# Patient Record
Sex: MALE | Race: WHITE | NOT HISPANIC OR LATINO
[De-identification: names, ages, dates, MRNs, and addresses within clinical notes are randomized per-mention and may not be internally consistent; named-entity substitution may affect disease eponyms.]

---
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
## 2019-01-01 ENCOUNTER — INPATIENT (INPATIENT)
Facility: HOSPITAL | Age: 19
LOS: 2 days | Discharge: HOME | End: 2019-01-04
Attending: INTERNAL MEDICINE | Admitting: INTERNAL MEDICINE
Payer: COMMERCIAL

## 2019-01-01 VITALS
SYSTOLIC BLOOD PRESSURE: 129 MMHG | HEART RATE: 96 BPM | TEMPERATURE: 99 F | DIASTOLIC BLOOD PRESSURE: 81 MMHG | OXYGEN SATURATION: 100 % | RESPIRATION RATE: 20 BRPM

## 2019-01-01 DIAGNOSIS — Z98.890 OTHER SPECIFIED POSTPROCEDURAL STATES: Chronic | ICD-10-CM

## 2019-01-01 DIAGNOSIS — L03.90 CELLULITIS, UNSPECIFIED: ICD-10-CM

## 2019-01-01 DIAGNOSIS — Z41.9 ENCOUNTER FOR PROCEDURE FOR PURPOSES OTHER THAN REMEDYING HEALTH STATE, UNSPECIFIED: Chronic | ICD-10-CM

## 2019-01-01 LAB
ALBUMIN SERPL ELPH-MCNC: 4.9 G/DL — SIGNIFICANT CHANGE UP (ref 3.3–5)
ALP SERPL-CCNC: 108 U/L — SIGNIFICANT CHANGE UP (ref 60–270)
ALT FLD-CCNC: 14 U/L — SIGNIFICANT CHANGE UP (ref 4–41)
AST SERPL-CCNC: 21 U/L — SIGNIFICANT CHANGE UP (ref 4–40)
BASE EXCESS BLDV CALC-SCNC: 2.9 MMOL/L — SIGNIFICANT CHANGE UP
BASOPHILS # BLD AUTO: 0.03 K/UL — SIGNIFICANT CHANGE UP (ref 0–0.2)
BASOPHILS NFR BLD AUTO: 0.2 % — SIGNIFICANT CHANGE UP (ref 0–2)
BILIRUB SERPL-MCNC: 0.3 MG/DL — SIGNIFICANT CHANGE UP (ref 0.2–1.2)
BLOOD GAS VENOUS - CREATININE: 0.61 MG/DL — SIGNIFICANT CHANGE UP (ref 0.5–1.3)
BUN SERPL-MCNC: 14 MG/DL — SIGNIFICANT CHANGE UP (ref 7–23)
CALCIUM SERPL-MCNC: 10.1 MG/DL — SIGNIFICANT CHANGE UP (ref 8.4–10.5)
CHLORIDE BLDV-SCNC: 105 MMOL/L — SIGNIFICANT CHANGE UP (ref 96–108)
CHLORIDE SERPL-SCNC: 101 MMOL/L — SIGNIFICANT CHANGE UP (ref 98–107)
CO2 SERPL-SCNC: 25 MMOL/L — SIGNIFICANT CHANGE UP (ref 22–31)
CREAT SERPL-MCNC: 0.78 MG/DL — SIGNIFICANT CHANGE UP (ref 0.5–1.3)
CRP SERPL-MCNC: 12.2 MG/L — HIGH
EOSINOPHIL # BLD AUTO: 0.26 K/UL — SIGNIFICANT CHANGE UP (ref 0–0.5)
EOSINOPHIL NFR BLD AUTO: 1.9 % — SIGNIFICANT CHANGE UP (ref 0–6)
ERYTHROCYTE [SEDIMENTATION RATE] IN BLOOD: 7 MM/HR — SIGNIFICANT CHANGE UP (ref 1–15)
GAS PNL BLDV: 135 MMOL/L — LOW (ref 136–146)
GLUCOSE BLDV-MCNC: 103 — HIGH (ref 70–99)
GLUCOSE SERPL-MCNC: 93 MG/DL — SIGNIFICANT CHANGE UP (ref 70–99)
HCO3 BLDV-SCNC: 25 MMOL/L — SIGNIFICANT CHANGE UP (ref 20–27)
HCT VFR BLD CALC: 41.1 % — SIGNIFICANT CHANGE UP (ref 39–50)
HCT VFR BLDV CALC: 43.4 % — SIGNIFICANT CHANGE UP (ref 39–51)
HGB BLD-MCNC: 14.2 G/DL — SIGNIFICANT CHANGE UP (ref 13–17)
HGB BLDV-MCNC: 14.1 G/DL — SIGNIFICANT CHANGE UP (ref 13–17)
IMM GRANULOCYTES # BLD AUTO: 0.04 # — SIGNIFICANT CHANGE UP
IMM GRANULOCYTES NFR BLD AUTO: 0.3 % — SIGNIFICANT CHANGE UP (ref 0–1.5)
LACTATE BLDV-MCNC: 1.3 MMOL/L — SIGNIFICANT CHANGE UP (ref 0.5–2)
LYMPHOCYTES # BLD AUTO: 16.9 % — SIGNIFICANT CHANGE UP (ref 13–44)
LYMPHOCYTES # BLD AUTO: 2.32 K/UL — SIGNIFICANT CHANGE UP (ref 1–3.3)
MAGNESIUM SERPL-MCNC: 2.2 MG/DL — SIGNIFICANT CHANGE UP (ref 1.6–2.6)
MCHC RBC-ENTMCNC: 30 PG — SIGNIFICANT CHANGE UP (ref 27–34)
MCHC RBC-ENTMCNC: 34.5 % — SIGNIFICANT CHANGE UP (ref 32–36)
MCV RBC AUTO: 86.9 FL — SIGNIFICANT CHANGE UP (ref 80–100)
MONOCYTES # BLD AUTO: 1.01 K/UL — HIGH (ref 0–0.9)
MONOCYTES NFR BLD AUTO: 7.4 % — SIGNIFICANT CHANGE UP (ref 2–14)
NEUTROPHILS # BLD AUTO: 10.04 K/UL — HIGH (ref 1.8–7.4)
NEUTROPHILS NFR BLD AUTO: 73.3 % — SIGNIFICANT CHANGE UP (ref 43–77)
NRBC # FLD: 0 — SIGNIFICANT CHANGE UP
PCO2 BLDV: 51 MMHG — SIGNIFICANT CHANGE UP (ref 41–51)
PH BLDV: 7.35 PH — SIGNIFICANT CHANGE UP (ref 7.32–7.43)
PHOSPHATE SERPL-MCNC: 3.7 MG/DL — SIGNIFICANT CHANGE UP (ref 2.5–4.5)
PLATELET # BLD AUTO: 290 K/UL — SIGNIFICANT CHANGE UP (ref 150–400)
PMV BLD: 9.1 FL — SIGNIFICANT CHANGE UP (ref 7–13)
PO2 BLDV: 21 MMHG — LOW (ref 35–40)
POTASSIUM BLDV-SCNC: 4.5 MMOL/L — SIGNIFICANT CHANGE UP (ref 3.4–4.5)
POTASSIUM SERPL-MCNC: 4.2 MMOL/L — SIGNIFICANT CHANGE UP (ref 3.5–5.3)
POTASSIUM SERPL-SCNC: 4.2 MMOL/L — SIGNIFICANT CHANGE UP (ref 3.5–5.3)
PROT SERPL-MCNC: 8.1 G/DL — SIGNIFICANT CHANGE UP (ref 6–8.3)
RBC # BLD: 4.73 M/UL — SIGNIFICANT CHANGE UP (ref 4.2–5.8)
RBC # FLD: 12.5 % — SIGNIFICANT CHANGE UP (ref 10.3–14.5)
SAO2 % BLDV: 28.3 % — LOW (ref 60–85)
SODIUM SERPL-SCNC: 140 MMOL/L — SIGNIFICANT CHANGE UP (ref 135–145)
WBC # BLD: 13.7 K/UL — HIGH (ref 3.8–10.5)
WBC # FLD AUTO: 13.7 K/UL — HIGH (ref 3.8–10.5)

## 2019-01-01 PROCEDURE — 99223 1ST HOSP IP/OBS HIGH 75: CPT

## 2019-01-01 RX ORDER — VANCOMYCIN HCL 1 G
1000 VIAL (EA) INTRAVENOUS ONCE
Qty: 0 | Refills: 0 | Status: COMPLETED | OUTPATIENT
Start: 2019-01-01 | End: 2019-01-01

## 2019-01-01 RX ORDER — PIPERACILLIN AND TAZOBACTAM 4; .5 G/20ML; G/20ML
3.38 INJECTION, POWDER, LYOPHILIZED, FOR SOLUTION INTRAVENOUS ONCE
Qty: 0 | Refills: 0 | Status: COMPLETED | OUTPATIENT
Start: 2019-01-01 | End: 2019-01-01

## 2019-01-01 RX ORDER — SODIUM CHLORIDE 9 MG/ML
1000 INJECTION, SOLUTION INTRAVENOUS ONCE
Qty: 0 | Refills: 0 | Status: COMPLETED | OUTPATIENT
Start: 2019-01-01 | End: 2019-01-01

## 2019-01-01 RX ORDER — DIPHENHYDRAMINE HCL 50 MG
25 CAPSULE ORAL ONCE
Qty: 0 | Refills: 0 | Status: COMPLETED | OUTPATIENT
Start: 2019-01-01 | End: 2019-01-02

## 2019-01-01 RX ADMIN — SODIUM CHLORIDE 2000 MILLILITER(S): 9 INJECTION, SOLUTION INTRAVENOUS at 22:58

## 2019-01-01 RX ADMIN — PIPERACILLIN AND TAZOBACTAM 200 GRAM(S): 4; .5 INJECTION, POWDER, LYOPHILIZED, FOR SOLUTION INTRAVENOUS at 22:14

## 2019-01-01 RX ADMIN — Medication 250 MILLIGRAM(S): at 22:47

## 2019-01-01 RX ADMIN — PIPERACILLIN AND TAZOBACTAM 3.38 GRAM(S): 4; .5 INJECTION, POWDER, LYOPHILIZED, FOR SOLUTION INTRAVENOUS at 22:45

## 2019-01-01 RX ADMIN — SODIUM CHLORIDE 1000 MILLILITER(S): 9 INJECTION, SOLUTION INTRAVENOUS at 23:57

## 2019-01-01 RX ADMIN — Medication 1000 MILLIGRAM(S): at 23:54

## 2019-01-01 NOTE — ED CLERICAL - NS ED CLERK NOTE PRE-ARRIVAL INFORMATION; ADDITIONAL PRE-ARRIVAL INFORMATION
S/P osteoma ablation 12/20. Now with possible cellulitis - on po antibiotics.  Also c/o weakness and lethargy, leg redness below knee X 2 days. No PMH

## 2019-01-01 NOTE — ED PROVIDER NOTE - OBJECTIVE STATEMENT
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 He first noticed swelling around the R shin area 3 days after the ablation but then it went down. However, starting 2 days ago he started noticing increased swelling and redness along with a low grade fever. He initially talked to his IR doctor Dr. Jose Louis and he gave him Amoxicillin 500mg TID yesterday and has taken a total of 5 doses. However, he called again Dr. Louis and he told him he should go in to the ED for concerns of cellulitis or OM. He denies any n/v/d/CP/SOB/drainage or abscesses. He is still able to ambulate
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
## 2019-01-01 NOTE — H&P ADULT - NSHPREVIEWOFSYSTEMS_GEN_ALL_CORE
Review of Systems:   CONSTITUTIONAL: subjective fever, no weight loss  EYES: No eye pain, visual disturbances, or discharge  ENMT:  No difficulty hearing, tinnitus, vertigo; No sinus or throat pain  NECK: No pain or stiffness  RESPIRATORY: No cough, wheezing, chills or hemoptysis; No shortness of breath  CARDIOVASCULAR: No chest pain, palpitations, dizziness, or leg swelling  GASTROINTESTINAL: No abdominal or epigastric pain. No nausea, vomiting, or hematemesis; No diarrhea or constipation. No melena or hematochezia.  GENITOURINARY: No dysuria, frequency, hematuria, or incontinence  NEUROLOGICAL: No headaches, memory loss, loss of strength, numbness, or tremors  SKIN: + scalp itching, right shin redness, pain to touch  LYMPH NODES: No enlarged glands  ENDOCRINE: No heat or cold intolerance; No hair loss  MUSCULOSKELETAL: No joint pain or swelling; No muscle, back, or extremity pain  PSYCHIATRIC: anxious  HEME/LYMPH: No easy bruising, or bleeding gums  ALLERY AND IMMUNOLOGIC: No hives or eczema

## 2019-01-01 NOTE — ED PROVIDER NOTE - CARE PLAN
Principal Discharge DX:	Cellulitis  Goal:	IV abx and needs MRI to further eval  Assessment and plan of treatment:	Concerns of cellulitis vs OM s/p R shin ablation, needs IV abx and MRI to further eval

## 2019-01-01 NOTE — ED PROVIDER NOTE - ATTENDING CONTRIBUTION TO CARE
18M p/w R shin pain and swelling -  S/p r shin bx 12/20/18; possible osteoid osteoma, done by IR here Dr Donovan louis; now having redness and swelling x 1-2 days, started on amox yesterday via phone.  Initially there was swelling but it went down; but got worse yesterday so called Dr.  Called Dr Louis today advised to come to ED.  Has gotten 5 doses of amox so far.  PMHX – none PSHX – none.  All – NKA.  Vs wnl.  Wound is erythematous, 10 x 5cm at site of incision, no exudate, reddened, swollen, tender.  Can walk on it.  Concern for osteo; plan check labs, xray, rx abx, fluids, to be d/w rads, admit.  VS:  unremarkable    GEN - NAD; well appearing; A+O x3   HEAD - NC/AT     ENT - PEERL, EOMI, mucous membranes dry, no discharge      NECK: Neck supple, non-tender without lymphadenopathy, no masses, no JVD  PULM - CTA b/l,  symmetric breath sounds  COR -  normal heart sounds    ABD - , ND, NT, soft,  BACK - no CVA tenderness, nontender spine     EXTREMS - no edema, no deformity, warm and well perfused  .  R ant shin wound no purulence,  large area of redness, warmth, tenderness surrounding (demarcated and dated).  diatal N/V/I.   SKIN - no rash or bruising      NEUROLOGIC - alert, CN 2-12 intact, sensation nl, motor no focal deficit.
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
## 2019-01-01 NOTE — ED PROVIDER NOTE - PSH
Elective surgery  teeth removed 2012  History of prior ablation treatment  Ablation of R shin osteoma

## 2019-01-01 NOTE — ED ADULT TRIAGE NOTE - CHIEF COMPLAINT QUOTE
pt. s/p ablation of an osteoma of the R shin on 12/20/18, reports increasing swelling around the site x 2 days and low grade fever at home (99.5F) today, placed on Amoxicillin 500mg yesterday, arrives w/ mild redness/swelling at the site of the ablation, no drainage/bleeding noted.

## 2019-01-01 NOTE — H&P ADULT - NSHPLABSRESULTS_GEN_ALL_CORE
Vital Signs Last 24 Hrs  T(C): 37.1 (02 Jan 2019 00:08), Max: 37.1 (01 Jan 2019 20:21)  T(F): 98.8 (02 Jan 2019 00:08), Max: 98.8 (02 Jan 2019 00:08)  HR: 86 (02 Jan 2019 00:08) (86 - 96)  BP: 127/66 (02 Jan 2019 00:08) (127/66 - 129/81)  BP(mean): --  RR: 20 (02 Jan 2019 00:08) (20 - 20)  SpO2: 100% (02 Jan 2019 00:08) (100% - 100%)                            14.2   13.70 )-----------( 290      ( 01 Jan 2019 22:10 )             41.1     01-01    140  |  101  |  14  ----------------------------<  93  4.2   |  25  |  0.78    Ca    10.1      01 Jan 2019 22:10  Phos  3.7     01-01  Mg     2.2     01-01    TPro  8.1  /  Alb  4.9  /  TBili  0.3  /  DBili  x   /  AST  21  /  ALT  14  /  AlkPhos  108  01-01    CAPILLARY BLOOD GLUCOSE

## 2019-01-01 NOTE — ED ADULT NURSE NOTE - NSIMPLEMENTINTERV_GEN_ALL_ED
Implemented All Universal Safety Interventions:  [place] to call system. Call bell, personal items and telephone within reach. Instruct patient to call for assistance. Room bathroom lighting operational. Non-slip footwear when patient is off stretcher. Physically safe environment: no spills, clutter or unnecessary equipment. Stretcher in lowest position, wheels locked, appropriate side rails in place.

## 2019-01-01 NOTE — H&P ADULT - NSHPPHYSICALEXAM_GEN_ALL_CORE
PHYSICAL EXAM:      Constitutional: mildy anxious  HEENT: EOMI, Normal Hearing  Neck: No LAD, No JVD  Back: Normal spine flexure, No CVA tenderness  Respiratory: CTAB  Cardiovascular: S1 and S2, RRR, no M/G/R  Gastrointestinal: BS+, soft, NT/ND  Extremities: No peripheral edema  Vascular: 2+ peripheral pulses  Neurological: A/O x 3, no focal deficits  Psychiatric: Normal mood, normal affect  Musculoskeletal: 5/5 strength b/l upper and lower extremities  Skin: central area of necrosis/mild serosanguinous drainage with surrounding erythema at right shin. PHYSICAL EXAM:      Constitutional: mildy anxious  HEENT: EOMI, Normal Hearing  Neck: No LAD, No JVD  Back: Normal spine flexure, No CVA tenderness  Respiratory: CTAB  Cardiovascular: loud S1 and S2, RRR, no M/G/R  Gastrointestinal: BS+, soft, NT/ND  Extremities: No peripheral edema  Vascular: 2+ peripheral pulses  Neurological: A/O x 3, no focal deficits  Psychiatric: Normal mood, normal affect  Musculoskeletal: 5/5 strength b/l upper and lower extremities  Skin: central area of necrosis/mild serosanguinous drainage with surrounding erythema at right shin.

## 2019-01-01 NOTE — H&P ADULT - PROBLEM SELECTOR PLAN 1
-concern for non-hematogenous OM based on recent bone ablation   -received zosyn and 3/4 of vancomycin at 1015 pm, 1050 pm respectively   -spoke with ID overnight, who recommend obtaining wound culture, and to hold further abx for now pending their eval in am unless pt decompensates.  -plain film ordered for now. Will likely need MR if suspicion remains for OM  -wound care ordered

## 2019-01-01 NOTE — ED PROVIDER NOTE - MEDICAL DECISION MAKING DETAILS
Admit to medicine and tx empirically for cellulitis vs OM s/p ablation, pending further imaging w/ MR

## 2019-01-01 NOTE — ED ADULT NURSE NOTE - OBJECTIVE STATEMENT
received pt to spot 27 aox3 in no apparent distress c/o R leg swelling/redness x1 days. Pt had recent procedure on 12/20/18 to remove osteoma had some mild swelling but states awoke yesterday with increased swelling and redness, was put on amoxicillin by PCP. Pt states began feeling flu like symptoms today with generalized malaise and was instructed to come to ED by PCP. Pt denies any leg pain, drainage fever, chills, n/v, SOB, numbness, tingling or difficulty walking. R leg appears slightly swollen and red from ankle up to mid calf. Leg is warm to touch. Procedure site is clean dry intact no drainage at this time, pedal pulses intact, no motor or neuro deficits at this time VSS pt afebrile will continue to monitor

## 2019-01-01 NOTE — ED PROVIDER NOTE - PLAN OF CARE
IV abx and needs MRI to further eval Concerns of cellulitis vs OM s/p R shin ablation, needs IV abx and MRI to further eval

## 2019-01-02 DIAGNOSIS — L03.115 CELLULITIS OF RIGHT LOWER LIMB: ICD-10-CM

## 2019-01-02 DIAGNOSIS — F41.9 ANXIETY DISORDER, UNSPECIFIED: ICD-10-CM

## 2019-01-02 DIAGNOSIS — T78.40XA ALLERGY, UNSPECIFIED, INITIAL ENCOUNTER: ICD-10-CM

## 2019-01-02 PROCEDURE — 99233 SBSQ HOSP IP/OBS HIGH 50: CPT | Mod: GC

## 2019-01-02 PROCEDURE — 99254 IP/OBS CNSLTJ NEW/EST MOD 60: CPT | Mod: GC

## 2019-01-02 PROCEDURE — 73590 X-RAY EXAM OF LOWER LEG: CPT | Mod: 26,RT

## 2019-01-02 RX ORDER — SODIUM CHLORIDE 9 MG/ML
1000 INJECTION INTRAMUSCULAR; INTRAVENOUS; SUBCUTANEOUS
Qty: 0 | Refills: 0 | Status: DISCONTINUED | OUTPATIENT
Start: 2019-01-02 | End: 2019-01-02

## 2019-01-02 RX ORDER — PIPERACILLIN AND TAZOBACTAM 4; .5 G/20ML; G/20ML
3.38 INJECTION, POWDER, LYOPHILIZED, FOR SOLUTION INTRAVENOUS EVERY 8 HOURS
Qty: 0 | Refills: 0 | Status: DISCONTINUED | OUTPATIENT
Start: 2019-01-02 | End: 2019-01-02

## 2019-01-02 RX ORDER — ENOXAPARIN SODIUM 100 MG/ML
40 INJECTION SUBCUTANEOUS DAILY
Qty: 0 | Refills: 0 | Status: DISCONTINUED | OUTPATIENT
Start: 2019-01-02 | End: 2019-01-03

## 2019-01-02 RX ORDER — CEFTRIAXONE 500 MG/1
2 INJECTION, POWDER, FOR SOLUTION INTRAMUSCULAR; INTRAVENOUS EVERY 24 HOURS
Qty: 0 | Refills: 0 | Status: DISCONTINUED | OUTPATIENT
Start: 2019-01-03 | End: 2019-01-04

## 2019-01-02 RX ORDER — VANCOMYCIN HCL 1 G
1000 VIAL (EA) INTRAVENOUS EVERY 12 HOURS
Qty: 0 | Refills: 0 | Status: DISCONTINUED | OUTPATIENT
Start: 2019-01-02 | End: 2019-01-02

## 2019-01-02 RX ORDER — CEFTRIAXONE 500 MG/1
INJECTION, POWDER, FOR SOLUTION INTRAMUSCULAR; INTRAVENOUS
Qty: 0 | Refills: 0 | Status: DISCONTINUED | OUTPATIENT
Start: 2019-01-02 | End: 2019-01-04

## 2019-01-02 RX ORDER — DIPHENHYDRAMINE HCL 50 MG
50 CAPSULE ORAL EVERY 12 HOURS
Qty: 0 | Refills: 0 | Status: DISCONTINUED | OUTPATIENT
Start: 2019-01-02 | End: 2019-01-04

## 2019-01-02 RX ORDER — CEFTRIAXONE 500 MG/1
2 INJECTION, POWDER, FOR SOLUTION INTRAMUSCULAR; INTRAVENOUS ONCE
Qty: 0 | Refills: 0 | Status: COMPLETED | OUTPATIENT
Start: 2019-01-02 | End: 2019-01-02

## 2019-01-02 RX ORDER — DIPHENHYDRAMINE HCL 50 MG
25 CAPSULE ORAL EVERY 6 HOURS
Qty: 0 | Refills: 0 | Status: DISCONTINUED | OUTPATIENT
Start: 2019-01-02 | End: 2019-01-04

## 2019-01-02 RX ORDER — VANCOMYCIN HCL 1 G
1000 VIAL (EA) INTRAVENOUS EVERY 12 HOURS
Qty: 0 | Refills: 0 | Status: DISCONTINUED | OUTPATIENT
Start: 2019-01-02 | End: 2019-01-03

## 2019-01-02 RX ORDER — ONDANSETRON 8 MG/1
4 TABLET, FILM COATED ORAL EVERY 8 HOURS
Qty: 0 | Refills: 0 | Status: DISCONTINUED | OUTPATIENT
Start: 2019-01-02 | End: 2019-01-04

## 2019-01-02 RX ADMIN — Medication 25 MILLIGRAM(S): at 00:01

## 2019-01-02 RX ADMIN — Medication 25 MILLIGRAM(S): at 17:40

## 2019-01-02 RX ADMIN — Medication 50 MILLIGRAM(S): at 09:27

## 2019-01-02 RX ADMIN — Medication 125 MILLIGRAM(S): at 18:27

## 2019-01-02 RX ADMIN — CEFTRIAXONE 100 GRAM(S): 500 INJECTION, POWDER, FOR SOLUTION INTRAMUSCULAR; INTRAVENOUS at 12:35

## 2019-01-02 RX ADMIN — ONDANSETRON 4 MILLIGRAM(S): 8 TABLET, FILM COATED ORAL at 09:27

## 2019-01-02 NOTE — CONSULT NOTE ADULT - SUBJECTIVE AND OBJECTIVE BOX
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 He was started on amoxicillin day prior to presentation with no improvement.   HPI (H&P):  19 yo m h/o ADHD no longer on ritalin, anxiety not on standing meds. Pt underwent ablation procedure/core biopsy  Dec 20th on right shin for suspected  osteoid osteoma. Biopsy has since come back NON-defintive for osteoid osteoma. 2 days ago developed  swelling/erythema around ablation site. Pt placed on amoxicillin yesterday by Dr Louis, pt reporting no improvement. Pt reports subjective fevers at home, here afebrile. Denies nausea, vomiting. WBC 13k, CRP 12.2. On exam, central area of necrosis/mild serosanguinous drainage with surrounding erythema at right shin. Received zosyn in ed. 3/4 through vanco dose, pt reported diffuse scalp itching, facial warmth. Pt pale at baseline, perhaps slightly less pale since complaint of  facial warmth. (01 Jan 2019 23:24)      PAST MEDICAL & SURGICAL HISTORY:  Osteoma  Unspecified fracture of shaft of left radius, subsequent encounter for closed fracture with routine healing  ADD (attention deficit disorder)  RSV bronchiolitis: as a child  History of prior ablation treatment: Ablation of R shin osteoma  Elective surgery: teeth removed 2012      Allergies  Concerta (Hives; Short breath)  Concerta (Unknown)  latex (Short breath; Rash; Urticaria)  latex (Unknown)  Ritalin (Hives)        ANTIMICROBIALS:  piperacillin/tazobactam IVPB. 3.375 every 8 hours  vancomycin  IVPB 1000 every 12 hours    MEDICATIONS  (STANDING):  piperacillin/tazobactam IVPB.   200 mL/Hr IV Intermittent (01-01-19 @ 22:14)    vancomycin  IVPB   250 mL/Hr IV Intermittent (01-01-19 @ 22:47)    OTHER MEDS: MEDICATIONS  (STANDING):  diphenhydrAMINE 50 every 12 hours PRN  ondansetron    Tablet 4 every 8 hours PRN    SOCIAL HISTORY:       FAMILY HISTORY:  No pertinent family history in first degree relatives    REVIEW OF SYSTEMS  [  ] ROS unobtainable because:    [  ] All other systems negative except as noted below:	    Constitutional:  [ ] fever [ ] chills  [ ] weight loss  [ ] weakness  Skin:  [ ] rash [ ] phlebitis	  Eyes: [ ] icterus [ ] pain  [ ] discharge	  ENMT: [ ] sore throat  [ ] thrush [ ] ulcers [ ] exudates  Respiratory: [ ] dyspnea [ ] hemoptysis [ ] cough [ ] sputum	  Cardiovascular:  [ ] chest pain [ ] palpitations [ ] edema	  Gastrointestinal:  [ ] nausea [ ] vomiting [ ] diarrhea [ ] constipation [ ] pain	  Genitourinary:  [ ] dysuria [ ] frequency [ ] hematuria [ ] discharge [ ] flank pain  [ ] incontinence  Musculoskeletal:  [ ] myalgias [ ] arthralgias [ ] arthritis  [ ] back pain  Neurological:  [ ] headache [ ] seizures  [ ] confusion/altered mental status  Psychiatric:  [ ] anxiety [ ] depression	  Hematology/Lymphatics:  [ ] lymphadenopathy  Endocrine:  [ ] adrenal [ ] thyroid  Allergic/Immunologic:	 [ ] transplant [ ] seasonal    Vital Signs Last 24 Hrs  T(F): 97.7 (01-02-19 @ 06:31), Max: 98.8 (01-02-19 @ 00:08)    Vital Signs Last 24 Hrs  HR: 81 (01-02-19 @ 06:31) (81 - 96)  BP: 102/56 (01-02-19 @ 06:31) (102/56 - 129/81)  RR: 16 (01-02-19 @ 06:31)  SpO2: 98% (01-02-19 @ 06:31) (98% - 100%)  Wt(kg): --    PHYSICAL EXAM:  PENDING, pre-populated below  General: non-toxic  HEAD/EYES: anicteric, PERRL  ENT:  supple  Cardiovascular:   S1, S2  Respiratory:  clear bilaterally  GI:  soft, non-tender, normal bowel sounds  :  no CVA tenderness   Musculoskeletal:  no synovitis  Neurologic:  grossly non-focal  Skin:  no rash  Lymph: no lymphadenopathy  Psychiatric:  appropriate affect  Vascular:  no phlebitis          WBC Count: 13.70 K/uL (01-01 @ 22:10)                            14.2   13.70 )-----------( 290      ( 01 Jan 2019 22:10 )             41.1       01-01    140  |  101  |  14  ----------------------------<  93  4.2   |  25  |  0.78    Ca    10.1      01 Jan 2019 22:10  Phos  3.7     01-01  Mg     2.2     01-01    TPro  8.1  /  Alb  4.9  /  TBili  0.3  /  DBili  x   /  AST  21  /  ALT  14  /  AlkPhos  108  01-01  Creatinine Trend: 0.78<--    MICROBIOLOGY:  pending blood culture x 2    RADIOLOGY:  no new. XR RLE and MRI RLE are ordered and pending. 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 He was started on amoxicillin day prior to presentation with no improvement. In the ED he was afebrile, normotensive, mildly tachycardic. He received Zosyn without complication but developed itching over head and upper chest about 30 minutes after his vancomycin dose started. He has still been able to walk on the leg since his surgery. It was initially painful and slightly swollen for about 3 days post-operatively, then improved. However, over the past 2-3 days it became increasingly swollen around the biopsy site (without sarah purulent discharge) and developed surrounding tenderness over the skin with some redness. The patient feels that the pain and redness are improved today after IV abx yesterday. Imaging has not yet been completed. He endorses subjective fevers at home.    He is sexually active with one female partner, using contraception regularly. No recent weight loss, constipation, diarrhea, recurring infections, joint swelling or redness, cough, chills, runny nose, sore throat, back pain, stiff neck, swollen lymph nodes, new skin lesions. He does endorse chronic acne and R ankle occasional pain without swelling/warmth/erythema, intermittent, preceding appearance of his R tibial lesion.    His surgical history is significant for L forearm metal plate years ago without recent redness.     PAST MEDICAL & SURGICAL HISTORY:  Osteoma  Unspecified fracture of shaft of left radius, subsequent encounter for closed fracture with routine healing  ADD (attention deficit disorder)  RSV bronchiolitis: as a child  History of prior ablation treatment: Ablation of R shin osteoma  Elective surgery: teeth removed 2012      Allergies  Concerta (Hives; Short breath)  Concerta (Unknown)  latex (Short breath; Rash; Urticaria)  latex (Unknown)  Ritalin (Hives)    ANTIMICROBIALS:  piperacillin/tazobactam IVPB. 3.375 every 8 hours  vancomycin  IVPB 1000 every 12 hours    MEDICATIONS  (STANDING):  piperacillin/tazobactam IVPB.   200 mL/Hr IV Intermittent (01-01-19 @ 22:14)    vancomycin  IVPB   250 mL/Hr IV Intermittent (01-01-19 @ 22:47)    OTHER MEDS: MEDICATIONS  (STANDING):  diphenhydrAMINE 50 every 12 hours PRN  ondansetron    Tablet 4 every 8 hours PRN    SOCIAL HISTORY:     Sexually active with 1 female partner  Vaping: endorses  Tobacco use: denies  Marijuana use: endorses, last 11/30  Other recreational drug use: denies  In college, studying [email or web address], 2 yr program    FAMILY HISTORY:  No pertinent family history in first degree relatives    REVIEW OF SYSTEMS  [  ] ROS unobtainable because:    [x  ] All other systems negative except as noted below:	    Constitutional:  [x ] fever [ ] chills  [ ] weight loss  [ ] weakness  Skin:  [x ] rash [ ] phlebitis	  Eyes: [ ] icterus [ ] pain  [ ] discharge	  ENMT: [ ] sore throat  [ ] thrush [ ] ulcers [ ] exudates  Respiratory: [ ] dyspnea [ ] hemoptysis [ ] cough [ ] sputum	  Cardiovascular:  [ ] chest pain [ ] palpitations [ ] edema	  Gastrointestinal:  [ ] nausea [ ] vomiting [ ] diarrhea [ ] constipation [ ] pain	  Genitourinary:  [ ] dysuria [ ] frequency [ ] hematuria [ ] discharge [ ] flank pain  [ ] incontinence  Musculoskeletal:  [ ] myalgias [ ] arthralgias [ ] arthritis  [ ] back pain [x] intermittent R ankle pain without swelling  Neurological:  [ ] headache [ ] seizures  [ ] confusion/altered mental status  Psychiatric:  [ ] anxiety [ ] depression	  Hematology/Lymphatics:  [ ] lymphadenopathy  Endocrine:  [ ] adrenal [ ] thyroid  Allergic/Immunologic:	 [ ] transplant [ ] seasonal    Vital Signs Last 24 Hrs  T(F): 97.7 (01-02-19 @ 06:31), Max: 98.8 (01-02-19 @ 00:08)    Vital Signs Last 24 Hrs  HR: 81 (01-02-19 @ 06:31) (81 - 96)  BP: 102/56 (01-02-19 @ 06:31) (102/56 - 129/81)  RR: 16 (01-02-19 @ 06:31)  SpO2: 98% (01-02-19 @ 06:31) (98% - 100%)  Wt(kg): --    PHYSICAL EXAM:  PENDING, pre-populated below  General: non-toxic male sitting up in bed in NAD, breathing comfortably  HEAD/EYES: anicteric, PERRL  ENT:  supple  Cardiovascular:  S1, S2  Respiratory:  clear bilaterally  GI:  soft, non-tender, normal bowel sounds  :  no CVA tenderness   Musculoskeletal:  R ankle no ttp/swelling/warmth, no joint swelling warmth/ttp/erythema throughout; no synovitis  Neurologic:  grossly non-focal  Skin: RLE tibial biopsy site with erythematous granulation tissue, no purulence, surrounding induration w/o fluctuance, ttp to marked border of surrounding erythema  Lymph: no lymphadenopathy  Psychiatric:  appropriate affect  Vascular:  no phlebitis    LABS:   WBC Count: 13.70 K/uL (01-01 @ 22:10)                        14.2   13.70 )-----------( 290      ( 01 Jan 2019 22:10 )             41.1     01-01    140  |  101  |  14  ----------------------------<  93  4.2   |  25  |  0.78    Ca    10.1      01 Jan 2019 22:10  Phos  3.7     01-01  Mg     2.2     01-01    TPro  8.1  /  Alb  4.9  /  TBili  0.3  /  DBili  x   /  AST  21  /  ALT  14  /  AlkPhos  108  01-01  Creatinine Trend: 0.78<--    MICROBIOLOGY:  pending blood culture x 2    RADIOLOGY:  no new. XR RLE and MRI RLE are ordered and pending.

## 2019-01-02 NOTE — CONSULT NOTE ADULT - ATTENDING COMMENTS
Right LE cellulitis better  Agree with imaging to r/o deeper infection  Continue vancomycin 1 gm iv q12 for staph coverage- likely red man reaction in ER  Cont ceftriaxone 1 gm iv q24 for strep/gnr coverage    Benny Bauman  Attending Physician   Division of Infectious Disease  Pager #[phone]  After 5pm/weekend or no response, call #[phone]

## 2019-01-02 NOTE — ED ADULT NURSE REASSESSMENT NOTE - NS ED NURSE REASSESS COMMENT FT1
pt left floor in no apparent distress. aox3 denying any abdominal pain or N/VM report given to JERAD Lee

## 2019-01-02 NOTE — PROGRESS NOTE ADULT - SUBJECTIVE AND OBJECTIVE BOX
Interventional Radiology Follow- Up Note      18y Male s/p right tibial osteoid osteoma ablation on 12/20/18 in Interventional Radiology with Dr. Louis. Patient seen and examined @ bedside.   No complaints offered. Patient is resting comfortably.     Vitals: T(F): 97.7 (01-02-19 @ 06:31), Max: 98.8 (01-02-19 @ 00:08)  HR: 81 (01-02-19 @ 06:31) (81 - 96)  BP: 102/56 (01-02-19 @ 06:31) (102/56 - 129/81)  RR: 16 (01-02-19 @ 06:31) (16 - 20)  SpO2: 98% (01-02-19 @ 06:31) (98% - 100%)  Wt(kg): --    LABS:                        14.2   13.70 )-----------( 290      ( 01 Jan 2019 22:10 )             41.1     01-01    140  |  101  |  14  ----------------------------<  93  4.2   |  25  |  0.78    Ca    10.1      01 Jan 2019 22:10  Phos  3.7     01-01  Mg     2.2     01-01    TPro  8.1  /  Alb  4.9  /  TBili  0.3  /  DBili  x   /  AST  21  /  ALT  14  /  AlkPhos  108  01-01      I&O's Detail    01 Jan 2019 07:01  -  02 Jan 2019 06:33  --------------------------------------------------------  IN:  Total IN: 0 mL    OUT:    Voided: 300 mL  Total OUT: 300 mL    Total NET: -300 mL            PHYSICAL EXAM:    General: Nontoxic, in NAD  Neuro:  Alert & oriented x 3  Extremities: RLE-Erythema and swelling noted surrounding the distal tibia. Tibial puncture site is indurated and erythematous TTP along soft tissue of distal tibia and along the bone. No fluctuance noted underlying the puncture site.     Impression: 18y Male s/p Right Tibial osteoid osteoma ablation presenting with RLE cellulitis    Plan:  -continue to monitor  -c/w IVF  -F/Up ID recs regarding antibiotics and possibility of osteomyelitis    Del Alvarado MD  PGY-5, Interventional Radiology     Please call IR at extension [phone] with any questions, concerns, or issues regarding above.

## 2019-01-02 NOTE — PROGRESS NOTE ADULT - PROBLEM SELECTOR PLAN 1
-concern for non-hematogenous OM based on recent bone ablation   -received zosyn and 3/4 of vancomycin at 1015 pm, 1050 pm respectively   -spoke with ID overnight, who recommend obtaining wound culture, and to hold further abx for now pending their eval in am unless pt decompensates.  -plain film ordered for now.  MR also ordered.  -wound care ordered -concern for non-hematogenous OM based on recent bone ablation   -received zosyn and 3/4 of vancomycin at 1015 pm, 1050 pm respectively   -spoke with ID overnight, who recommend obtaining wound culture which was obtained.  For now will continue w/ ceftriaxone as there is low clinical suspicion for MRSA given absence of pustular drainage in the setting of vancomycin sensitivity by patient.   -plain film ordered for now.  MR also ordered.  -wound care ordered

## 2019-01-02 NOTE — CONSULT NOTE ADULT - ASSESSMENT
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     #RLE cellulitis, r/o non-hematogenous osteomyelitis: exam is concerning for RLE cellulitis surrounding biopsy site with ttp, warmth, redness. Symptoms improved overnight after abx received in ED. Patient had facial flushing and pruritus after first vancomycin dose; no reported symptoms of anaphylaxis such as urticaria, angioedema, diffuse pruritus, hypotension, n/v. Imaging is pending to r/o non-hematogenous osteomyelitis.   -would c/w vancomycin at half of the initial infusion rate, pre-treating with antihistamine, for adequate MRSA coverage.  -to minimize risk of nephrotoxicity, would change Zosyn to ceftriaxone. Low risk for pseudomonas infection, and a third-gen cephalosporin should provide adequate gram negative coverage.   -agree with MRI RLE.      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     #RLE cellulitis, r/o non-hematogenous osteomyelitis: exam is concerning for RLE cellulitis surrounding biopsy site with ttp, warmth, redness. Symptoms improved overnight after abx received in ED. Patient had facial flushing and pruritus after first vancomycin dose; no reported symptoms of anaphylaxis such as urticaria, angioedema, diffuse pruritus, hypotension, n/v. Imaging is pending to r/o non-hematogenous osteomyelitis.   -would c/w vancomycin at half of the initial infusion rate, pre-treating with antihistamine, for adequate MRSA coverage.  -to minimize risk of nephrotoxicity, would change Zosyn to ceftriaxone. Low risk for pseudomonas infection, and a third-gen cephalosporin should provide adequate gram negative coverage.   -agree with MRI RLE.      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     #RLE cellulitis, r/o non-hematogenous osteomyelitis: exam is concerning for RLE cellulitis surrounding biopsy site with ttp, warmth, redness. Symptoms improved overnight after abx received in ED. Patient had facial flushing and pruritus after first vancomycin dose; no reported symptoms of anaphylaxis such as urticaria, angioedema, diffuse pruritus, hypotension, n/v. Imaging is pending to r/o non-hematogenous osteomyelitis.   -would c/w vancomycin at half of the initial infusion rate, pre-treating with antihistamine, for adequate MRSA coverage.  -to minimize risk of nephrotoxicity, would change Zosyn to ceftriaxone. Low risk for pseudomonas infection, and a third-gen cephalosporin should provide adequate gram negative coverage.   -f/u blood cultures. Send wound culture if debrided.  -agree with MRI RLE.      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     #RLE cellulitis, r/o non-hematogenous osteomyelitis: exam is concerning for RLE cellulitis surrounding biopsy site with ttp, warmth, redness. Symptoms improved overnight after abx received in ED. Patient had facial flushing and pruritus after first vancomycin dose; no reported symptoms of anaphylaxis such as urticaria, angioedema, diffuse pruritus, hypotension, n/v. Imaging is pending to r/o non-hematogenous osteomyelitis.   -would c/w vancomycin at half of the initial infusion rate, pre-treating with antihistamine, for adequate MRSA coverage.  -to minimize risk of nephrotoxicity, would change Zosyn to ceftriaxone. Low risk for pseudomonas infection, and a third-gen cephalosporin should provide adequate gram negative coverage.   -f/u blood cultures. Send wound culture if debrided.  -agree with MRI RLE.      Heron DODSON consult resident, pgy3  Pager [phone], [phone]

## 2019-01-02 NOTE — PROGRESS NOTE ADULT - SUBJECTIVE AND OBJECTIVE BOX
Alisa Arzate MD  PGY1 | Dept of Internal Medicine  Spectra [id number]/[facility] [phone]        Patient is a 18y old  Male who presents with a chief complaint of leg infection (02 Jan 2019 09:24)      SUBJECTIVE / OVERNIGHT EVENTS:  Pt admitted overnight to rule out osteomyelitis.  No fevers/chills overnight our pustular drainage from affected/biopsy site.  ROS negative for CP, SOB, abdominal pain, V/D/C + N.         CONSTITUTIONAL:  No weight loss, fever, chills, weakness or fatigue.  HEENT:  Eyes:  No visual loss, blurred vision, double vision or yellow sclerae. Ears, Nose, Throat:  No hearing loss, sneezing, congestion, runny nose or sore throat.  SKIN:  No rash or itching.  CARDIOVASCULAR:  No chest pain, chest pressure or chest discomfort. No palpitations or edema.  RESPIRATORY:  No shortness of breath, cough or sputum.  GASTROINTESTINAL:  No anorexia, nausea, vomiting or diarrhea. No abdominal pain or blood.  GENITOURINARY:  Denies hematuria, dysuria.   NEUROLOGICAL:  No headache, dizziness, syncope, paralysis, ataxia, numbness or tingling in the extremities. No change in bowel or bladder control.  MUSCULOSKELETAL:  No muscle, back pain, joint pain or stiffness.  HEMATOLOGIC:  No anemia, bleeding or bruising.  LYMPHATICS:  No enlarged nodes. No history of splenectomy.  PSYCHIATRIC:  No history of depression or anxiety.  ENDOCRINOLOGIC:  No reports of sweating, cold or heat intolerance. No polyuria or polydipsia.  ALLERGIES:  No history of asthma, hives, eczema or rhinitis.        Vital Signs Last 24 Hrs  T(C): 36.5 (02 Jan 2019 06:31), Max: 37.1 (01 Jan 2019 20:21)  T(F): 97.7 (02 Jan 2019 06:31), Max: 98.8 (02 Jan 2019 00:08)  HR: 81 (02 Jan 2019 06:31) (81 - 96)  BP: 102/56 (02 Jan 2019 06:31) (102/56 - 129/81)  BP(mean): --  RR: 16 (02 Jan 2019 06:31) (16 - 20)  SpO2: 98% (02 Jan 2019 06:31) (98% - 100%)    I&O's Summary    01 Jan 2019 07:01  -  02 Jan 2019 07:00  --------------------------------------------------------  IN: 0 mL / OUT: 300 mL / NET: -300 mL        PHYSICAL EXAM:  GENERAL: NAD, well-developed  HEAD:  Atraumatic, Normocephalic  EYES: EOMI, PERRLA, conjunctiva and sclera clear  NECK: Supple, No JVD  CHEST/LUNG: Clear to auscultation bilaterally; No wheeze  HEART: Regular rate and rhythm; No murmurs, rubs, or gallops  ABDOMEN: Soft, Nontender, Nondistended; Bowel sounds present  EXTREMITIES:  2+ Peripheral Pulses, No clubbing, cyanosis, or edema.  R leg indurated area marked w/ central opening which was where his biopsy site was.  Elevated at biopsy site w no serosanguinous drainage or pustular drainage.    PSYCH: AAOx3  NEUROLOGY: non-focal  SKIN: No rashes or lesions      MEDICATIONS  (STANDING):  cefTRIAXone   IVPB        MEDICATIONS  (PRN):  diphenhydrAMINE 50 milliGRAM(s) Oral every 12 hours PRN Rash and/or Itching  ondansetron    Tablet 4 milliGRAM(s) Oral every 8 hours PRN Nausea and/or Vomiting      LABS:  CAPILLARY BLOOD GLUCOSE                              14.2   13.70 )-----------( 290      ( 01 Jan 2019 22:10 )             41.1     Auto Eosinophil # 0.26  / Auto Eosinophil % 1.9   / Auto Neutrophil # 10.04 / Auto Neutrophil % 73.3  / BANDS % x        Hgb Trend: 14.2<--  01-01    140  |  101  |  14  ----------------------------<  93  4.2   |  25  |  0.78    Ca    10.1      01 Jan 2019 22:10  Mg     2.2     01-01  Phos  3.7     01-01  TPro  8.1  /  Alb  4.9  /  TBili  0.3  /  DBili  x   /  AST  21  /  ALT  14  /  AlkPhos  108  01-01    Creatinine Trend: 0.78<--                ABG:   VBG: ( 22:10 ) - VBG - pH: 7.35  | pCO2: 51    | pO2: 21    | Lactate: 1.3

## 2019-01-02 NOTE — ED ADULT NURSE REASSESSMENT NOTE - NS ED NURSE REASSESS COMMENT FT1
pt c/o of itchiness and heat after completion of vanco, provider at bedside pt medicated as per order pt denies feeling SOB, chest pain/tightness, chest pain red from scratching no rash noted to extremities, report given to JERAD Patton will continue to monitor until leaves floor

## 2019-01-03 ENCOUNTER — TRANSCRIPTION ENCOUNTER (OUTPATIENT)
Age: 19
End: 2019-01-03

## 2019-01-03 DIAGNOSIS — Z29.9 ENCOUNTER FOR PROPHYLACTIC MEASURES, UNSPECIFIED: ICD-10-CM

## 2019-01-03 LAB
BASOPHILS # BLD AUTO: 0.03 K/UL — SIGNIFICANT CHANGE UP (ref 0–0.2)
BASOPHILS NFR BLD AUTO: 0.4 % — SIGNIFICANT CHANGE UP (ref 0–2)
BUN SERPL-MCNC: 14 MG/DL — SIGNIFICANT CHANGE UP (ref 7–23)
CALCIUM SERPL-MCNC: 10 MG/DL — SIGNIFICANT CHANGE UP (ref 8.4–10.5)
CHLORIDE SERPL-SCNC: 101 MMOL/L — SIGNIFICANT CHANGE UP (ref 98–107)
CO2 SERPL-SCNC: 24 MMOL/L — SIGNIFICANT CHANGE UP (ref 22–31)
CREAT SERPL-MCNC: 0.72 MG/DL — SIGNIFICANT CHANGE UP (ref 0.5–1.3)
EOSINOPHIL # BLD AUTO: 0.31 K/UL — SIGNIFICANT CHANGE UP (ref 0–0.5)
EOSINOPHIL NFR BLD AUTO: 4.3 % — SIGNIFICANT CHANGE UP (ref 0–6)
GLUCOSE SERPL-MCNC: 93 MG/DL — SIGNIFICANT CHANGE UP (ref 70–99)
HCT VFR BLD CALC: 41.3 % — SIGNIFICANT CHANGE UP (ref 39–50)
HGB BLD-MCNC: 13.9 G/DL — SIGNIFICANT CHANGE UP (ref 13–17)
IMM GRANULOCYTES NFR BLD AUTO: 0.3 % — SIGNIFICANT CHANGE UP (ref 0–1.5)
LYMPHOCYTES # BLD AUTO: 2.37 K/UL — SIGNIFICANT CHANGE UP (ref 1–3.3)
LYMPHOCYTES # BLD AUTO: 32.6 % — SIGNIFICANT CHANGE UP (ref 13–44)
MAGNESIUM SERPL-MCNC: 2.1 MG/DL — SIGNIFICANT CHANGE UP (ref 1.6–2.6)
MCHC RBC-ENTMCNC: 29.6 PG — SIGNIFICANT CHANGE UP (ref 27–34)
MCHC RBC-ENTMCNC: 33.7 % — SIGNIFICANT CHANGE UP (ref 32–36)
MCV RBC AUTO: 87.9 FL — SIGNIFICANT CHANGE UP (ref 80–100)
MONOCYTES # BLD AUTO: 0.83 K/UL — SIGNIFICANT CHANGE UP (ref 0–0.9)
MONOCYTES NFR BLD AUTO: 11.4 % — SIGNIFICANT CHANGE UP (ref 2–14)
NEUTROPHILS # BLD AUTO: 3.7 K/UL — SIGNIFICANT CHANGE UP (ref 1.8–7.4)
NEUTROPHILS NFR BLD AUTO: 51 % — SIGNIFICANT CHANGE UP (ref 43–77)
NRBC # FLD: 0 — SIGNIFICANT CHANGE UP
PHOSPHATE SERPL-MCNC: 4.5 MG/DL — SIGNIFICANT CHANGE UP (ref 2.5–4.5)
PLATELET # BLD AUTO: 282 K/UL — SIGNIFICANT CHANGE UP (ref 150–400)
PMV BLD: 9.9 FL — SIGNIFICANT CHANGE UP (ref 7–13)
POTASSIUM SERPL-MCNC: 4 MMOL/L — SIGNIFICANT CHANGE UP (ref 3.5–5.3)
POTASSIUM SERPL-SCNC: 4 MMOL/L — SIGNIFICANT CHANGE UP (ref 3.5–5.3)
RBC # BLD: 4.7 M/UL — SIGNIFICANT CHANGE UP (ref 4.2–5.8)
RBC # FLD: 12.8 % — SIGNIFICANT CHANGE UP (ref 10.3–14.5)
SODIUM SERPL-SCNC: 138 MMOL/L — SIGNIFICANT CHANGE UP (ref 135–145)
SPECIMEN SOURCE: SIGNIFICANT CHANGE UP
SPECIMEN SOURCE: SIGNIFICANT CHANGE UP
VANCOMYCIN TROUGH SERPL-MCNC: 5.4 UG/ML — LOW (ref 10–20)
WBC # BLD: 7.26 K/UL — SIGNIFICANT CHANGE UP (ref 3.8–10.5)
WBC # FLD AUTO: 7.26 K/UL — SIGNIFICANT CHANGE UP (ref 3.8–10.5)

## 2019-01-03 PROCEDURE — 73720 MRI LWR EXTREMITY W/O&W/DYE: CPT | Mod: 26,RT

## 2019-01-03 PROCEDURE — 99232 SBSQ HOSP IP/OBS MODERATE 35: CPT | Mod: GC

## 2019-01-03 PROCEDURE — 99233 SBSQ HOSP IP/OBS HIGH 50: CPT | Mod: GC

## 2019-01-03 RX ORDER — ACETAMINOPHEN 500 MG
650 TABLET ORAL ONCE
Qty: 0 | Refills: 0 | Status: COMPLETED | OUTPATIENT
Start: 2019-01-03 | End: 2019-01-03

## 2019-01-03 RX ORDER — IBUPROFEN 200 MG
600 TABLET ORAL ONCE
Qty: 0 | Refills: 0 | Status: COMPLETED | OUTPATIENT
Start: 2019-01-03 | End: 2019-01-03

## 2019-01-03 RX ORDER — VANCOMYCIN HCL 1 G
1000 VIAL (EA) INTRAVENOUS EVERY 8 HOURS
Qty: 0 | Refills: 0 | Status: DISCONTINUED | OUTPATIENT
Start: 2019-01-03 | End: 2019-01-04

## 2019-01-03 RX ADMIN — Medication 600 MILLIGRAM(S): at 19:30

## 2019-01-03 RX ADMIN — Medication 125 MILLIGRAM(S): at 05:56

## 2019-01-03 RX ADMIN — CEFTRIAXONE 100 GRAM(S): 500 INJECTION, POWDER, FOR SOLUTION INTRAMUSCULAR; INTRAVENOUS at 10:00

## 2019-01-03 RX ADMIN — Medication 650 MILLIGRAM(S): at 18:47

## 2019-01-03 RX ADMIN — Medication 600 MILLIGRAM(S): at 20:00

## 2019-01-03 RX ADMIN — Medication 50 MILLIGRAM(S): at 18:45

## 2019-01-03 RX ADMIN — Medication 250 MILLIGRAM(S): at 19:30

## 2019-01-03 RX ADMIN — Medication 50 MILLIGRAM(S): at 04:53

## 2019-01-03 RX ADMIN — Medication 650 MILLIGRAM(S): at 17:47

## 2019-01-03 NOTE — DISCHARGE NOTE ADULT - PLAN OF CARE
continue antibiotics You came to the ED with a leg infection.  We performed an X-ray and MRI of your leg to rule osteomyelitis, which is an infection of your bone.  The results showed no infection of the bone and you are being discharged with antibiotics for cellulitis.  Please continue your prescribed course and follow up with your PCP. You came to the ED with a leg infection.  We performed an X-ray and MRI of your leg to rule osteomyelitis, which is an infection of your bone.  The results showed no infection of the bone and you are being discharged with antibiotics for cellulitis.  Please continue your prescribed course which is Keflex 1 500mg pill 4x a day (every 6 hours) and follow up with your PCP.

## 2019-01-03 NOTE — DISCHARGE NOTE ADULT - MEDICATION SUMMARY - MEDICATIONS TO TAKE
I will START or STAY ON the medications listed below when I get home from the hospital:    Keflex 500 mg oral capsule  -- 1 cap(s) by mouth 4 times a day for cellulitis   -- Finish all this medication unless otherwise directed by prescriber.    -- Indication: For Cellulitis of right lower extremity

## 2019-01-03 NOTE — DISCHARGE NOTE ADULT - CARE PROVIDER_API CALL
Richard Bardales), Pediatrics  [address] [place] [address]  Phone: [phone]  Fax: [phone] Richard Bardales), Pediatrics  [address] [place] [address]  Phone: [phone]  Fax: [phone]    Donovan Louis), [facility]  [address] [place] [address]  Phone: [phone]  Fax: [phone]

## 2019-01-03 NOTE — PROGRESS NOTE ADULT - SUBJECTIVE AND OBJECTIVE BOX
Alisa Arzate MD  PGY1 | Dept of Internal Medicine  Spectra [id number]/[facility] [phone]        Patient is a 18y old  Male who presents with a chief complaint of leg infection (02 Jan 2019 13:51)      SUBJECTIVE / OVERNIGHT EVENTS: No events overnight.          CONSTITUTIONAL:  No weight loss, fever, chills, weakness or fatigue.  HEENT:  Eyes:  No visual loss, blurred vision, double vision or yellow sclerae. Ears, Nose, Throat:  No hearing loss, sneezing, congestion, runny nose or sore throat.  SKIN:  No rash or itching.  CARDIOVASCULAR:  No chest pain, chest pressure or chest discomfort. No palpitations or edema.  RESPIRATORY:  No shortness of breath, cough or sputum.  GASTROINTESTINAL:  No anorexia, nausea, vomiting or diarrhea. No abdominal pain or blood.  GENITOURINARY:  Denies hematuria, dysuria.   NEUROLOGICAL:  No headache, dizziness, syncope, paralysis, ataxia, numbness or tingling in the extremities. No change in bowel or bladder control.  MUSCULOSKELETAL:  No muscle, back pain, joint pain or stiffness.  HEMATOLOGIC:  No anemia, bleeding or bruising.  LYMPHATICS:  No enlarged nodes. No history of splenectomy.  PSYCHIATRIC:  No history of depression or anxiety.  ENDOCRINOLOGIC:  No reports of sweating, cold or heat intolerance. No polyuria or polydipsia.  ALLERGIES:  No history of asthma, hives, eczema or rhinitis.        Vital Signs Last 24 Hrs  T(C): 36.6 (03 Jan 2019 04:28), Max: 37.1 (02 Jan 2019 14:35)  T(F): 97.9 (03 Jan 2019 04:28), Max: 98.7 (02 Jan 2019 14:35)  HR: 74 (03 Jan 2019 04:28) (66 - 80)  BP: 112/76 (03 Jan 2019 04:28) (112/76 - 124/78)  BP(mean): --  RR: 18 (03 Jan 2019 04:28) (18 - 18)  SpO2: 100% (03 Jan 2019 04:28) (100% - 100%)    I&O's Summary      PHYSICAL EXAM:  GENERAL: NAD, well-developed  HEAD:  Atraumatic, Normocephalic  EYES: EOMI, PERRLA, conjunctiva and sclera clear  NECK: Supple, No JVD  CHEST/LUNG: Clear to auscultation bilaterally; No wheeze  HEART: Regular rate and rhythm; No murmurs, rubs, or gallops  ABDOMEN: Soft, Nontender, Nondistended; Bowel sounds present  EXTREMITIES:  2+ Peripheral Pulses, No clubbing, cyanosis, or edema  PSYCH: AAOx3  NEUROLOGY: non-focal  SKIN: No rashes or lesions      MEDICATIONS  (STANDING):  cefTRIAXone   IVPB      cefTRIAXone   IVPB 2 Gram(s) IV Intermittent every 24 hours  enoxaparin Injectable 40 milliGRAM(s) SubCutaneous daily  vancomycin  IVPB 1000 milliGRAM(s) IV Intermittent every 12 hours    MEDICATIONS  (PRN):  diphenhydrAMINE 50 milliGRAM(s) Oral every 12 hours PRN Rash and/or Itching  diphenhydrAMINE 25 milliGRAM(s) Oral every 6 hours PRN Rash and/or Itching  ondansetron    Tablet 4 milliGRAM(s) Oral every 8 hours PRN Nausea and/or Vomiting      LABS:  CAPILLARY BLOOD GLUCOSE                              13.9   7.26  )-----------( 282      ( 03 Jan 2019 06:00 )             41.3     Auto Eosinophil # 0.31  / Auto Eosinophil % 4.3   / Auto Neutrophil # 3.70  / Auto Neutrophil % 51.0  / BANDS % x                            14.2   13.70 )-----------( 290      ( 01 Jan 2019 22:10 )             41.1     Auto Eosinophil # 0.26  / Auto Eosinophil % 1.9   / Auto Neutrophil # 10.04 / Auto Neutrophil % 73.3  / BANDS % x        Hgb Trend: 13.9<--, 14.2<--  01-01    140  |  101  |  14  ----------------------------<  93  4.2   |  25  |  0.78    Ca    10.1      01 Jan 2019 22:10  Mg     2.2     01-01  Phos  3.7     01-01  TPro  8.1  /  Alb  4.9  /  TBili  0.3  /  DBili  x   /  AST  21  /  ALT  14  /  AlkPhos  108  01-01    Creatinine Trend: 0.78<--        MICROBIOLOGY:     Culture - Blood (collected 02 Jan 2019 00:42)  Source: BLOOD PERIPHERAL  Preliminary Report (03 Jan 2019 00:40):    NO ORGANISMS ISOLATED    NO ORGANISMS ISOLATED AT 24 HOURS    Culture - Blood (collected 02 Jan 2019 00:42)  Source: BLOOD VENOUS  Preliminary Report (03 Jan 2019 00:40):    NO ORGANISMS ISOLATED    NO ORGANISMS ISOLATED AT 24 HOURS Alisa Arzate MD  PGY1 | Dept of Internal Medicine  Spectra [phone]/[facility] [phone]        Patient is a 18y old  Male who presents with a chief complaint of leg infection (02 Jan 2019 13:51)      SUBJECTIVE / OVERNIGHT EVENTS: No events overnight.  Confirmed that pt is to get MRI today of his leg.  ROS negative         CONSTITUTIONAL:  No weight loss, fever, chills, weakness or fatigue.  HEENT:  Eyes:  No visual loss, blurred vision, double vision or yellow sclerae. Ears, Nose, Throat:  No hearing loss, sneezing, congestion, runny nose or sore throat.  SKIN:  No rash or itching.  CARDIOVASCULAR:  No chest pain, chest pressure or chest discomfort. No palpitations or edema.  RESPIRATORY:  No shortness of breath, cough or sputum.  GASTROINTESTINAL:  No anorexia, nausea, vomiting or diarrhea. No abdominal pain or blood.  GENITOURINARY:  Denies hematuria, dysuria.   NEUROLOGICAL:  No headache, dizziness, syncope, paralysis, ataxia, numbness or tingling in the extremities. No change in bowel or bladder control.  MUSCULOSKELETAL:  No muscle, back pain, joint pain or stiffness.  HEMATOLOGIC:  No anemia, bleeding or bruising.  LYMPHATICS:  No enlarged nodes. No history of splenectomy.  PSYCHIATRIC:  No history of depression or anxiety.  ENDOCRINOLOGIC:  No reports of sweating, cold or heat intolerance. No polyuria or polydipsia.  ALLERGIES:  No history of asthma, hives, eczema or rhinitis.        Vital Signs Last 24 Hrs  T(C): 36.6 (03 Jan 2019 04:28), Max: 37.1 (02 Jan 2019 14:35)  T(F): 97.9 (03 Jan 2019 04:28), Max: 98.7 (02 Jan 2019 14:35)  HR: 74 (03 Jan 2019 04:28) (66 - 80)  BP: 112/76 (03 Jan 2019 04:28) (112/76 - 124/78)  BP(mean): --  RR: 18 (03 Jan 2019 04:28) (18 - 18)  SpO2: 100% (03 Jan 2019 04:28) (100% - 100%)    I&O's Summary      PHYSICAL EXAM:  GENERAL: NAD, well-developed  HEAD:  Atraumatic, Normocephalic  EYES: EOMI, PERRLA, conjunctiva and sclera clear  NECK: Supple, No JVD  CHEST/LUNG: Clear to auscultation bilaterally; No wheeze  HEART: Regular rate and rhythm; No murmurs, rubs, or gallops  ABDOMEN: Soft, Nontender, Nondistended; Bowel sounds present  EXTREMITIES:  2+ Peripheral Pulses, No clubbing, cyanosis, or edema.  R leg indurated area marked w/ central opening which was where his biopsy site was.  Elevated at biopsy site w no serosanguinous drainage or pustular drainage.    PSYCH: AAOx3  NEUROLOGY: non-focal  SKIN: No rashes or lesions      MEDICATIONS  (STANDING):  cefTRIAXone   IVPB      cefTRIAXone   IVPB 2 Gram(s) IV Intermittent every 24 hours  enoxaparin Injectable 40 milliGRAM(s) SubCutaneous daily  vancomycin  IVPB 1000 milliGRAM(s) IV Intermittent every 12 hours    MEDICATIONS  (PRN):  diphenhydrAMINE 50 milliGRAM(s) Oral every 12 hours PRN Rash and/or Itching  diphenhydrAMINE 25 milliGRAM(s) Oral every 6 hours PRN Rash and/or Itching  ondansetron    Tablet 4 milliGRAM(s) Oral every 8 hours PRN Nausea and/or Vomiting      LABS:  CAPILLARY BLOOD GLUCOSE                              13.9   7.26  )-----------( 282      ( 03 Jan 2019 06:00 )             41.3     Auto Eosinophil # 0.31  / Auto Eosinophil % 4.3   / Auto Neutrophil # 3.70  / Auto Neutrophil % 51.0  / BANDS % x                            14.2   13.70 )-----------( 290      ( 01 Jan 2019 22:10 )             41.1     Auto Eosinophil # 0.26  / Auto Eosinophil % 1.9   / Auto Neutrophil # 10.04 / Auto Neutrophil % 73.3  / BANDS % x        Hgb Trend: 13.9<--, 14.2<--  01-01    140  |  101  |  14  ----------------------------<  93  4.2   |  25  |  0.78    Ca    10.1      01 Jan 2019 22:10  Mg     2.2     01-01  Phos  3.7     01-01  TPro  8.1  /  Alb  4.9  /  TBili  0.3  /  DBili  x   /  AST  21  /  ALT  14  /  AlkPhos  108  01-01    Creatinine Trend: 0.78<--        MICROBIOLOGY:     Culture - Blood (collected 02 Jan 2019 00:42)  Source: BLOOD PERIPHERAL  Preliminary Report (03 Jan 2019 00:40):    NO ORGANISMS ISOLATED    NO ORGANISMS ISOLATED AT 24 HOURS    Culture - Blood (collected 02 Jan 2019 00:42)  Source: BLOOD VENOUS  Preliminary Report (03 Jan 2019 00:40):    NO ORGANISMS ISOLATED    NO ORGANISMS ISOLATED AT 24 HOURS

## 2019-01-03 NOTE — PROGRESS NOTE ADULT - SUBJECTIVE AND OBJECTIVE BOX
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     Patient seen and examined bedside resting comfortably.  No complaints offered.  States right leg feels better overall    T(F): 97.9 (01-03-19 @ 04:28), Max: 98.7 (01-02-19 @ 14:35)  HR: 74 (01-03-19 @ 04:28) (66 - 80)  BP: 112/76 (01-03-19 @ 04:28) (112/76 - 124/78)  RR: 18 (01-03-19 @ 04:28) (18 - 18)  SpO2: 100% (01-03-19 @ 04:28) (100% - 100%)  Wt(kg): --    LABS:                        13.9   7.26  )-----------( 282      ( 03 Jan 2019 06:00 )             41.3     01-03    138  |  101  |  14  ----------------------------<  93  4.0   |  24  |  0.72    Ca    10.0      03 Jan 2019 06:00  Phos  4.5     01-03  Mg     2.1     01-03    TPro  8.1  /  Alb  4.9  /  TBili  0.3  /  DBili  x   /  AST  21  /  ALT  14  /  AlkPhos  108  01-01      I&O's Detail        PHYSICAL EXAM:  General: Nontoxic, in NAD  Neuro:  Alert & oriented x 3  Extremities: puncture site intact without drainage.  mild erythema with resolved induration.        Impression: 18y Male admitted with Cellulitis    PMH Osteoma  No pertinent past medical history  Unspecified fracture of shaft of left radius, subsequent encounter for closed fracture with routine healing  ADD (attention deficit disorder)  RSV bronchiolitis

## 2019-01-03 NOTE — DISCHARGE NOTE ADULT - CARE PLAN
Principal Discharge DX:	Cellulitis of right lower extremity  Goal:	continue antibiotics  Assessment and plan of treatment:	You came to the ED with a leg infection.  We performed an X-ray and MRI of your leg to rule osteomyelitis, which is an infection of your bone.  The results showed no infection of the bone and you are being discharged with antibiotics for cellulitis.  Please continue your prescribed course and follow up with your PCP. Principal Discharge DX:	Cellulitis of right lower extremity  Goal:	continue antibiotics  Assessment and plan of treatment:	You came to the ED with a leg infection.  We performed an X-ray and MRI of your leg to rule osteomyelitis, which is an infection of your bone.  The results showed no infection of the bone and you are being discharged with antibiotics for cellulitis.  Please continue your prescribed course which is Keflex 1 500mg pill 4x a day (every 6 hours) and follow up with your PCP.

## 2019-01-03 NOTE — DISCHARGE NOTE ADULT - ADDITIONAL INSTRUCTIONS
Please follow up with your PCP within 1-2 weeks of discharge Please follow up with your PCP within 1-2 weeks of discharge as well as interventional radiology.  If you choose Please follow up with your PCP within 1-2 weeks of discharge as well as interventional radiology.  You can find a PCP within the [facility] network by visiting [email or web address].

## 2019-01-03 NOTE — DISCHARGE NOTE ADULT - CARE PROVIDERS DIRECT ADDRESSES
[email or web address]

## 2019-01-03 NOTE — DISCHARGE NOTE ADULT - PATIENT PORTAL LINK FT
You can access the [facility] Patient Portal, offered by [facility], by registering with the following website: [email or web address]

## 2019-01-03 NOTE — PROGRESS NOTE ADULT - PROBLEM SELECTOR PLAN 1
- X ray of leg showed no evidence of osteomyelitis however pt. pending MRI today for further confirmation.   -wound care ordered  - Treating empirically for now w/ vanc and ceftriaxone ( started 1/2)  - If MRI shows no evidence of osteo will reach out to ID about outpatient abx recs for discharge.

## 2019-01-03 NOTE — DISCHARGE NOTE ADULT - HOSPITAL COURSE
17 yo M w/ a PMHx of ADHD and anxiety presented on 1/1/19 for evaluation of a R lower leg infection.  Pt had recently had an ablation/core biopsy of his bone on 12/20 to rule out osteoid osteoma and had swelling w/ no pustular drainage 2 days before presentation.  Pt was treated with IV antibiotics with Vanco and Ceftriaxone.  An xray of the site showed no signs of osteomyelitis but an MRI was performed for further evaluation which was also negative for osteomyelitis.  Pt was deemed stable for discharge on 1/3/19 with instructions to complete a 4 day course of Keflex and to follow up with his PCP upon completion of antibiotics.

## 2019-01-03 NOTE — PROGRESS NOTE ADULT - SUBJECTIVE AND OBJECTIVE BOX
Inverval History/ROS:   No overnight events or new complaints this AM. Reports that the pain and redness in RLE is improving on abx. He tolerated the vancomycin doses. Denies watery diarrhea. No new dizziness, confusion, neck pain, dysuria, changes in vision/hearing, n/v/d/c, flushing, joint pain/redness/swelling, fevers, chills, cough, runny nose, sore throat.     Allergies  Concerta (Hives; Short breath)  Concerta (Unknown)  latex (Short breath; Rash; Urticaria)  latex (Unknown)  Ritalin (Hives)    Intolerances: n/a    ANTIMICROBIALS:  cefTRIAXone   IVPB    cefTRIAXone   IVPB 2 every 24 hours  vancomycin  IVPB 1000 every 12 hours    OTHER MEDS:  diphenhydrAMINE 50 milliGRAM(s) Oral every 12 hours PRN  diphenhydrAMINE 25 milliGRAM(s) Oral every 6 hours PRN  ondansetron    Tablet 4 milliGRAM(s) Oral every 8 hours PRN    Vital Signs Last 24 Hrs  T(C): 36.6 (03 Jan 2019 04:28), Max: 37.1 (02 Jan 2019 14:35)  T(F): 97.9 (03 Jan 2019 04:28), Max: 98.7 (02 Jan 2019 14:35)  HR: 74 (03 Jan 2019 04:28) (66 - 80)  BP: 112/76 (03 Jan 2019 04:28) (112/76 - 124/78)  BP(mean): --  RR: 18 (03 Jan 2019 04:28) (18 - 18)  SpO2: 100% (03 Jan 2019 04:28) (100% - 100%)    PHYSICAL EXAM:  General: [x ] non-toxic  HEAD/EYES: [x ] PERRL [x ] white sclera [ ] icterus  ENT:  x ] normal [ x] supple [ ] thrush [ ] pharyngeal exudate  Cardiovascular:   [ ] murmur [x ] normal [ ] PPM/AICD  Respiratory:  [x ] no respiratory distress   GI:  [x ] soft, non-tender, normal bowel sounds  Musculoskeletal:  [x ] no synovitis  Neurologic:  [x ] non-focal exam   Skin:  [ ] no rash [x] erythematous rash around wound with granulation tissue anterior R tibia  Lymph: [ x] no lymphadenopathy  Psychiatric:  [ x] appropriate affect [ ] alert & oriented  Lines:  [x ] no phlebitis [ ] central line    LABS:             13.9   7.26  )-----------( 282      ( 03 Jan 2019 06:00 )             41.3       01-03    138  |  101  |  14  ----------------------------<  93  4.0   |  24  |  0.72    Ca    10.0      03 Jan 2019 06:00  Phos  4.5     01-03  Mg     2.1     01-03    TPro  8.1  /  Alb  4.9  /  TBili  0.3  /  DBili  x   /  AST  21  /  ALT  14  /  AlkPhos  108  01-01          MICROBIOLOGY:  Culture - Blood (01.02.19 @ 00:42)    Culture - Blood:   NO ORGANISMS ISOLATED  NO ORGANISMS ISOLATED AT 24 HOURS    Specimen Source: BLOOD PERIPHERAL    Culture - Blood (01.02.19 @ 00:42)    Culture - Blood:   NO ORGANISMS ISOLATED  NO ORGANISMS ISOLATED AT 24 HOURS    Specimen Source: BLOOD VENOUS    RADIOLOGY:  < from: Xray Tibia + Fibula 2 Views, Right (01.02.19 @ 10:54) >  IMPRESSION:  Narrow linear ablation tract in anterolateral mid right tibial diaphysis   with associated surrounding fusiform cortical thickening and periosteal   bone formation from the prior osteoid osteoma.    No tracking gas collections, retained radiopaque foreign body material,   or gross radiographic radiographic evidence for acute osteomyelitis.    Bone island again noted in proximal lateral tibial metadiaphysis. No   additional focal osseous lesions.     No fractures or dislocation.    Preserved knee and ankle joint spaces.    < end of copied text >

## 2019-01-04 VITALS
HEART RATE: 72 BPM | DIASTOLIC BLOOD PRESSURE: 50 MMHG | TEMPERATURE: 98 F | SYSTOLIC BLOOD PRESSURE: 96 MMHG | RESPIRATION RATE: 18 BRPM | OXYGEN SATURATION: 100 %

## 2019-01-04 LAB
BUN SERPL-MCNC: 11 MG/DL — SIGNIFICANT CHANGE UP (ref 7–23)
CALCIUM SERPL-MCNC: 9.9 MG/DL — SIGNIFICANT CHANGE UP (ref 8.4–10.5)
CHLORIDE SERPL-SCNC: 100 MMOL/L — SIGNIFICANT CHANGE UP (ref 98–107)
CO2 SERPL-SCNC: 25 MMOL/L — SIGNIFICANT CHANGE UP (ref 22–31)
CREAT SERPL-MCNC: 0.81 MG/DL — SIGNIFICANT CHANGE UP (ref 0.5–1.3)
GLUCOSE SERPL-MCNC: 108 MG/DL — HIGH (ref 70–99)
HCT VFR BLD CALC: 41.1 % — SIGNIFICANT CHANGE UP (ref 39–50)
HGB BLD-MCNC: 14.2 G/DL — SIGNIFICANT CHANGE UP (ref 13–17)
MAGNESIUM SERPL-MCNC: 2.1 MG/DL — SIGNIFICANT CHANGE UP (ref 1.6–2.6)
MCHC RBC-ENTMCNC: 30 PG — SIGNIFICANT CHANGE UP (ref 27–34)
MCHC RBC-ENTMCNC: 34.5 % — SIGNIFICANT CHANGE UP (ref 32–36)
MCV RBC AUTO: 86.9 FL — SIGNIFICANT CHANGE UP (ref 80–100)
NRBC # FLD: 0 — SIGNIFICANT CHANGE UP
PHOSPHATE SERPL-MCNC: 5 MG/DL — HIGH (ref 2.5–4.5)
PLATELET # BLD AUTO: 285 K/UL — SIGNIFICANT CHANGE UP (ref 150–400)
PMV BLD: 9.4 FL — SIGNIFICANT CHANGE UP (ref 7–13)
POTASSIUM SERPL-MCNC: 3.9 MMOL/L — SIGNIFICANT CHANGE UP (ref 3.5–5.3)
POTASSIUM SERPL-SCNC: 3.9 MMOL/L — SIGNIFICANT CHANGE UP (ref 3.5–5.3)
RBC # BLD: 4.73 M/UL — SIGNIFICANT CHANGE UP (ref 4.2–5.8)
RBC # FLD: 12.5 % — SIGNIFICANT CHANGE UP (ref 10.3–14.5)
SODIUM SERPL-SCNC: 137 MMOL/L — SIGNIFICANT CHANGE UP (ref 135–145)
WBC # BLD: 6.05 K/UL — SIGNIFICANT CHANGE UP (ref 3.8–10.5)
WBC # FLD AUTO: 6.05 K/UL — SIGNIFICANT CHANGE UP (ref 3.8–10.5)

## 2019-01-04 PROCEDURE — 99239 HOSP IP/OBS DSCHRG MGMT >30: CPT

## 2019-01-04 PROCEDURE — 99232 SBSQ HOSP IP/OBS MODERATE 35: CPT

## 2019-01-04 RX ORDER — IBUPROFEN 200 MG
600 TABLET ORAL ONCE
Qty: 0 | Refills: 0 | Status: COMPLETED | OUTPATIENT
Start: 2019-01-04 | End: 2019-01-04

## 2019-01-04 RX ORDER — CEPHALEXIN 500 MG
1 CAPSULE ORAL
Qty: 16 | Refills: 0
Start: 2019-01-04 | End: 2019-01-07

## 2019-01-04 RX ADMIN — Medication 25 MILLIGRAM(S): at 03:29

## 2019-01-04 RX ADMIN — Medication 600 MILLIGRAM(S): at 11:04

## 2019-01-04 RX ADMIN — Medication 50 MILLIGRAM(S): at 11:05

## 2019-01-04 RX ADMIN — Medication 250 MILLIGRAM(S): at 03:59

## 2019-01-04 RX ADMIN — CEFTRIAXONE 100 GRAM(S): 500 INJECTION, POWDER, FOR SOLUTION INTRAMUSCULAR; INTRAVENOUS at 10:38

## 2019-01-04 RX ADMIN — Medication 600 MILLIGRAM(S): at 12:04

## 2019-01-04 RX ADMIN — Medication 250 MILLIGRAM(S): at 11:05

## 2019-01-04 NOTE — PROGRESS NOTE ADULT - PROBLEM SELECTOR PLAN 1
-no fever, WBC ok  -no abscess on MRI  -leg better  -keflex 500 mg po every 6 hours x 4 days  -doubt OM given MRI findings   -risk of long term abx > benefit given low suspicion of OM  -monitor clinically and if worsening, may need repeat imaging

## 2019-01-04 NOTE — PROGRESS NOTE ADULT - PROBLEM SELECTOR PLAN 4
- pt ambulating, no lovenox or heparin   - Diet regular   - Dispo: if MRI negative for osteo will d/c w/ oral abx w/ outpt. f/u
- pt ambulating, no lovenox or heparin   - Diet regular   - Dispo: if MRI negative for osteo will d/c w/ oral abx w/ outpt. f/u

## 2019-01-04 NOTE — PROGRESS NOTE ADULT - SUBJECTIVE AND OBJECTIVE BOX
KVNG ENRIQUE 18y [id number]    Patient is a 18y old  Male who presents with a chief complaint of leg infection (04 Jan 2019 09:02)      Follow Up/CC:  ID following for cellulitis    Interval History/ROS: no fever, feels ok    Allergies    Concerta (Hives; Short breath)  Concerta (Unknown)  latex (Short breath; Rash; Urticaria)  latex (Unknown)  Ritalin (Hives)    Intolerances        ANTIMICROBIALS:  cefTRIAXone   IVPB    cefTRIAXone   IVPB 2 every 24 hours  vancomycin  IVPB 1000 every 8 hours      MEDICATIONS  (STANDING):  cefTRIAXone   IVPB      cefTRIAXone   IVPB 2 Gram(s) IV Intermittent every 24 hours  vancomycin  IVPB 1000 milliGRAM(s) IV Intermittent every 8 hours    MEDICATIONS  (PRN):  diphenhydrAMINE 50 milliGRAM(s) Oral every 12 hours PRN Rash and/or Itching  diphenhydrAMINE 25 milliGRAM(s) Oral every 6 hours PRN Rash and/or Itching  ondansetron    Tablet 4 milliGRAM(s) Oral every 8 hours PRN Nausea and/or Vomiting        Vital Signs Last 24 Hrs  T(C): 36.9 (04 Jan 2019 14:15), Max: 37.1 (03 Jan 2019 20:50)  T(F): 98.5 (04 Jan 2019 14:15), Max: 98.7 (03 Jan 2019 20:50)  HR: 72 (04 Jan 2019 14:15) (72 - 79)  BP: 96/50 (04 Jan 2019 14:15) (96/50 - 111/69)  BP(mean): --  RR: 18 (04 Jan 2019 14:15) (18 - 18)  SpO2: 100% (04 Jan 2019 14:15) (100% - 100%)    CBC Full  -  ( 04 Jan 2019 06:00 )  WBC Count : 6.05 K/uL  Hemoglobin : 14.2 g/dL  Hematocrit : 41.1 %  Platelet Count - Automated : 285 K/uL  Mean Cell Volume : 86.9 fL  Mean Cell Hemoglobin : 30.0 pg  Mean Cell Hemoglobin Concentration : 34.5 %  Auto Neutrophil # : x  Auto Lymphocyte # : x  Auto Monocyte # : x  Auto Eosinophil # : x  Auto Basophil # : x  Auto Neutrophil % : x  Auto Lymphocyte % : x  Auto Monocyte % : x  Auto Eosinophil % : x  Auto Basophil % : x    01-04    137  |  100  |  11  ----------------------------<  108<H>  3.9   |  25  |  0.81    Ca    9.9      04 Jan 2019 06:00  Phos  5.0     01-04  Mg     2.1     01-04            MICROBIOLOGY:  BLOOD VENOUS  01-02-19 --  --  --      vancomycin Level, Trough: 5.4 ug/mL (01-03-19 @ 17:18)      RADIOLOGY    < from: MR Lower Ext Non-joint w/wo IV Cont, Right (01.03.19 @ 15:25) >  Nonspecific mild pretibial soft tissue swelling/inflammation however   without a discrete underlying drainable abscess collection.    Residual nonspecific mild lateral mid tibial periossous edema and   intramedullary marrow signal alteration, differential considerations   include residual post procedure changes or possible continued reaction to   incompletely ablated lesion. Osteomyelitis cannot be entirely excluded   although felt to be less likely as current signal alteration is not   progressed/worsened compared to the pre-MRI study. Continued clinical   observation/follow-up suggested with interval repeat imaging to reassess   if clinically warranted.    Remainder of study is unremarkable.

## 2019-01-04 NOTE — PROGRESS NOTE ADULT - SUBJECTIVE AND OBJECTIVE BOX
Alisa Arzate MD  PGY1 | Dept of Internal Medicine  Spectra [id number]/[facility] [phone]        Patient is a 18y old  Male who presents with a chief complaint of leg infection (03 Jan 2019 16:16)      SUBJECTIVE / OVERNIGHT EVENTS:  No events overnight.  Pt had MRI yesterday.  Complaining of leg pain near biopsy site that is sharp and nature and comes and goes; reports that the pain was happening before infection.  Day 3 abx         CONSTITUTIONAL:  No weight loss, fever, chills, weakness or fatigue.  HEENT:  Eyes:  No visual loss, blurred vision, double vision or yellow sclerae. Ears, Nose, Throat:  No hearing loss, sneezing, congestion, runny nose or sore throat.  SKIN:  No rash or itching.  CARDIOVASCULAR:  No chest pain, chest pressure or chest discomfort. No palpitations or edema.  RESPIRATORY:  No shortness of breath, cough or sputum.  GASTROINTESTINAL:  No anorexia, nausea, vomiting or diarrhea. No abdominal pain or blood.  GENITOURINARY:  Denies hematuria, dysuria.   NEUROLOGICAL:  No headache, dizziness, syncope, paralysis, ataxia, numbness or tingling in the extremities. No change in bowel or bladder control.  MUSCULOSKELETAL:  No muscle, back pain, joint pain or stiffness.  HEMATOLOGIC:  No anemia, bleeding or bruising.  LYMPHATICS:  No enlarged nodes. No history of splenectomy.  PSYCHIATRIC:  No history of depression or anxiety.  ENDOCRINOLOGIC:  No reports of sweating, cold or heat intolerance. No polyuria or polydipsia.  ALLERGIES:  No history of asthma, hives, eczema or rhinitis.        Vital Signs Last 24 Hrs  T(C): 36.9 (04 Jan 2019 06:36), Max: 37.1 (03 Jan 2019 20:50)  T(F): 98.4 (04 Jan 2019 06:36), Max: 98.7 (03 Jan 2019 20:50)  HR: 79 (04 Jan 2019 06:36) (78 - 87)  BP: 107/65 (04 Jan 2019 06:36) (107/65 - 111/69)  BP(mean): --  RR: 18 (04 Jan 2019 06:36) (18 - 18)  SpO2: 100% (04 Jan 2019 06:36) (98% - 100%)    I&O's Summary      PHYSICAL EXAM:  GENERAL: NAD, well-developed  HEAD:  Atraumatic, Normocephalic  EYES: EOMI, PERRLA, conjunctiva and sclera clear  NECK: Supple, No JVD  CHEST/LUNG: Clear to auscultation bilaterally; No wheeze  HEART: Regular rate and rhythm; No murmurs, rubs, or gallops  ABDOMEN: Soft, Nontender, Nondistended; Bowel sounds present  EXTREMITIES:  2+ Peripheral Pulses, No clubbing, cyanosis, or edema.  Resolving erythema near biopsy site   PSYCH: AAOx3  NEUROLOGY: non-focal  SKIN: No rashes or lesions      MEDICATIONS  (STANDING):  cefTRIAXone   IVPB      cefTRIAXone   IVPB 2 Gram(s) IV Intermittent every 24 hours  vancomycin  IVPB 1000 milliGRAM(s) IV Intermittent every 8 hours    MEDICATIONS  (PRN):  diphenhydrAMINE 50 milliGRAM(s) Oral every 12 hours PRN Rash and/or Itching  diphenhydrAMINE 25 milliGRAM(s) Oral every 6 hours PRN Rash and/or Itching  ondansetron    Tablet 4 milliGRAM(s) Oral every 8 hours PRN Nausea and/or Vomiting      LABS:  CAPILLARY BLOOD GLUCOSE                              14.2   6.05  )-----------( 285      ( 04 Jan 2019 06:00 )             41.1     Auto Eosinophil # x     / Auto Eosinophil % x     / Auto Neutrophil # x     / Auto Neutrophil % x     / BANDS % x                            13.9   7.26  )-----------( 282      ( 03 Jan 2019 06:00 )             41.3     Auto Eosinophil # 0.31  / Auto Eosinophil % 4.3   / Auto Neutrophil # 3.70  / Auto Neutrophil % 51.0  / BANDS % x        Hgb Trend: 14.2<--, 13.9<--, 14.2<--  01-04    137  |  100  |  11  ----------------------------<  108<H>  3.9   |  25  |  0.81  01-03    138  |  101  |  14  ----------------------------<  93  4.0   |  24  |  0.72    Ca    9.9      04 Jan 2019 06:00  Mg     2.1     01-04  Phos  5.0     01-04    Creatinine Trend: 0.81<--, 0.72<--, 0.78<--                ABG:   VBG:     MICROBIOLOGY:     Culture - Blood (collected 02 Jan 2019 00:42)  Source: BLOOD PERIPHERAL  Preliminary Report (04 Jan 2019 00:40):    NO ORGANISMS ISOLATED    NO ORGANISMS ISOLATED AT 48 HRS.    Culture - Blood (collected 02 Jan 2019 00:42)  Source: BLOOD VENOUS  Preliminary Report (04 Jan 2019 00:40):    NO ORGANISMS ISOLATED    NO ORGANISMS ISOLATED AT 48 HRS.

## 2019-01-04 NOTE — PROGRESS NOTE ADULT - ASSESSMENT
19 yo m with right shin infection after ablation/biopsy site on 12/20 for r/o of osteoid osteoma admitted to r/o osteomyelitis vs cellulitis.
17 yo m with right shin infection after ablation/biopsy site on 12/20 for r/o of osteoid osteoma.
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     #RLE cellulitis, r/o non-hematogenous osteomyelitis: improving cellulitis on vancomycin and ceftriaxone. Afebrile with blood cultures negative x 2, and WBC count down-trending. Tolerating vancomycin.   -will discuss abx deescalation with attending this afternoon.   -f/u MRI. No evidence of OM on tibial XR yesterday.     Heron Garza  ID consult resident, [id number]  Pager [phone], [phone]
Continue IV abx per ID recommendation.  Continue current care  Will continue to follow
17 yo m with right shin infection after ablation/biopsy site on 12/20 for r/o of osteoid osteoma admitted to r/o osteomyelitis vs cellulitis.
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     #RLE cellulitis, r/o non-hematogenous osteomyelitis: improving cellulitis on vancomycin and ceftriaxone. Afebrile with blood cultures negative x 2, and WBC count down-trending.

## 2019-01-04 NOTE — PROGRESS NOTE ADULT - PROBLEM SELECTOR PROBLEM 2
Allergic reaction, initial encounter

## 2019-01-04 NOTE — PROGRESS NOTE ADULT - ATTENDING COMMENTS
Patient seen and examined and agree with the residents assessment and plan.    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    Since starting IV abx the erythema nearly resolved.  Puncture site scabbed over without significant erythema but with surround induration.  No purulent or bloody drainage.    Patient denies fever, chills, N/V  Vss  Radiograph of tight tibia no signs of acute osteomyelitis.  MRI pending, although the inflammation from recent ablation likely to confound the findings.    Continue IV Abx per ID  Will continue to follow
Patient seen and examined. Agree with above note by resident.    #Cellulitis of right leg. Erythema much improved per patient. No drainage noted. Central open area noted. Possible ablation related erythema vs cellulitis. ESR is low. Suspicion low for OM. Will await ID recommendations. For now c/w ceftriaxone and vanc per ID. MRI pending.     Awaiting MRI. Likely can DC home if no signs of OM.
-cont CTX  -f/u MRI  -DC vanco in AM if blood cx negative    Benny Bauman  Attending Physician   Division of Infectious Disease  Pager #[phone]  After 5pm/weekend or no response, call #[phone]
Patient seen and examined. Agree with above note by resident.    #Cellulitis of right leg. Erythema much improved per patient. No drainage noted. Central open area noted. Possible ablation related erythema vs cellulitis. ESR is low. Suspicion low for OM. Will await ID recommendations. For now c/w ceftriaxone. Xray pending.     Plan discussed with mother at bedside. Awaiting final ID recommendations.
41 min discharge time
Benny Bauman  Attending Physician   Division of Infectious Disease  Pager #[phone]  After 5pm/weekend or no response, call #[phone]

## 2019-01-04 NOTE — PROGRESS NOTE ADULT - PROBLEM SELECTOR PLAN 2
-unclear if allergic reaction or anxiety related  -pt denies sob. Normotensive; benadryl x 1 given

## 2019-01-04 NOTE — PROGRESS NOTE ADULT - PROBLEM SELECTOR PLAN 1
- X ray of leg showed no evidence of osteomyelitis however pt. pending MRI today for further confirmation.   -wound care ordered  - Treating empirically for now w/ vanc and ceftriaxone ( started 1/2/19)  - If MRI shows no evidence of osteo will reach out to ID about outpatient abx recs for discharge; official read pending

## 2019-01-07 ENCOUNTER — CHART COPY (OUTPATIENT)
Age: 19
End: 2019-01-07

## 2019-01-07 LAB
BACTERIA BLD CULT: SIGNIFICANT CHANGE UP
BACTERIA BLD CULT: SIGNIFICANT CHANGE UP

## 2019-01-08 PROBLEM — D16.9 BENIGN NEOPLASM OF BONE AND ARTICULAR CARTILAGE, UNSPECIFIED: Chronic | Status: ACTIVE | Noted: 2019-01-01

## 2019-01-15 ENCOUNTER — APPOINTMENT (OUTPATIENT)
Dept: INTERVENTIONAL RADIOLOGY/VASCULAR | Facility: CLINIC | Age: 19
End: 2019-01-15
Payer: COMMERCIAL

## 2019-01-15 VITALS
HEART RATE: 109 BPM | DIASTOLIC BLOOD PRESSURE: 69 MMHG | WEIGHT: 123 LBS | OXYGEN SATURATION: 100 % | TEMPERATURE: 98.6 F | HEIGHT: 70 IN | BODY MASS INDEX: 17.61 KG/M2 | RESPIRATION RATE: 14 BRPM | SYSTOLIC BLOOD PRESSURE: 101 MMHG

## 2019-01-15 PROCEDURE — 99214 OFFICE O/P EST MOD 30 MIN: CPT

## 2019-01-24 ENCOUNTER — APPOINTMENT (OUTPATIENT)
Dept: INTERVENTIONAL RADIOLOGY/VASCULAR | Facility: CLINIC | Age: 19
End: 2019-01-24
Payer: COMMERCIAL

## 2019-01-24 VITALS
HEIGHT: 70 IN | WEIGHT: 132 LBS | SYSTOLIC BLOOD PRESSURE: 115 MMHG | BODY MASS INDEX: 18.9 KG/M2 | RESPIRATION RATE: 16 BRPM | OXYGEN SATURATION: 98 % | HEART RATE: 89 BPM | DIASTOLIC BLOOD PRESSURE: 74 MMHG

## 2019-01-24 PROCEDURE — 99214 OFFICE O/P EST MOD 30 MIN: CPT

## 2019-01-24 NOTE — PHYSICAL EXAM
[Alert] : alert [Normal Hearing] : hearing was normal [No Respiratory Distress] : no respiratory distress [Normal Rate] : heart rate was normal  [Normal Gait] : normal gait [No Tremors] : no tremors [Oriented x3] : oriented to person, place, and time

## 2019-01-30 ENCOUNTER — CHART COPY (OUTPATIENT)
Age: 19
End: 2019-01-30

## 2019-02-05 ENCOUNTER — APPOINTMENT (OUTPATIENT)
Dept: INTERVENTIONAL RADIOLOGY/VASCULAR | Facility: CLINIC | Age: 19
End: 2019-02-05
Payer: COMMERCIAL

## 2019-02-05 VITALS
BODY MASS INDEX: 18.9 KG/M2 | WEIGHT: 132 LBS | RESPIRATION RATE: 18 BRPM | HEART RATE: 91 BPM | OXYGEN SATURATION: 98 % | SYSTOLIC BLOOD PRESSURE: 101 MMHG | HEIGHT: 70 IN | DIASTOLIC BLOOD PRESSURE: 69 MMHG

## 2019-02-05 PROCEDURE — 99214 OFFICE O/P EST MOD 30 MIN: CPT

## 2019-02-05 RX ORDER — AMOXICILLIN 500 MG/1
500 TABLET, FILM COATED ORAL 3 TIMES DAILY
Qty: 30 | Refills: 0 | Status: COMPLETED | COMMUNITY
Start: 2018-12-31 | End: 2019-02-05

## 2019-02-05 NOTE — ASSESSMENT
[FreeTextEntry1] : Denies fever/chills or pain in the right lower leg. On examination, small round eschar intact and present on the puncture site with slight erythema in the surrounding skin slightly improved compared to 1/15 . No discharge.  Recommended continue applying silver sulfadiazine twice a day to the area.  Patient is to follow up in about two weeks. All questions answered. \par \par

## 2019-02-05 NOTE — REVIEW OF SYSTEMS
[Fever] : no fever [Chills] : no chills [Loss Of Hearing] : no hearing loss [Chest Pain] : no chest pain [Palpitations] : no palpitations [Shortness Of Breath] : no shortness of breath [Diarrhea] : no diarrhea [Easy Bleeding] : no tendency for easy bleeding [Easy Bruising] : no tendency for easy bruising

## 2019-02-05 NOTE — PHYSICAL EXAM
[Alert] : alert [Normal Sclera/Conjunctiva] : normal sclera/conjunctiva [Normal Hearing] : hearing was normal [No Respiratory Distress] : no respiratory distress [No Accessory Muscle Use] : no accessory muscle use [Normal Rate] : heart rate was normal  [No Tremors] : no tremors [Oriented x3] : oriented to person, place, and time

## 2019-02-13 NOTE — HISTORY OF PRESENT ILLNESS
[FreeTextEntry1] : 18 years old male with hx of right leg pain that started about 5 months ago. denies having any trauma or fall pt reports the pain as excruciating pain that usually occurs at the end of the day. pain does wake him up from his sleep at night. Pain gets worst with activity rates the pain as 8/10 pt reports relief from pain when he takes Naprosyn once or twice a day.\par \par pt had xray, CT scan and recent MRI done that revealed a lesion. \par s/p biopsy and RFA of right tibial osteoid osteoma on 12/20/18. Pt presented to ER few day hoover with complaints of swelling and erythema. Pt had xray and MRI done that revealed “nonspecific mild pretibial soft tissue swelling/inflammation however without a discrete underlying drainable abscess collection. Pt was treated with antibiotics and came to the office last week for site check and noted to have serous drainage from the incision site. There are no signs of infection. Pt was advised to apply Betadine once a day and leave the area open to air ,\par \par Pathology revealed “core biopsy consisting of sclerotic cortical bone demonstrating a well demarcated area of remodeling/ woven bone with thermal artifact, limiting evaluation. No definitive nidus is identified. These findings are not definitive for osteoid osteoma”\par \par Pt was seen last week and presents to the office for wound check on examination there is a small round eschar at the incision site. no drainage noted and minimal inflammation noted around the site. Denies having any pain. \par today the site looks better no drainage noted, inflammation around the site is better. reports having some numbness around the site. \par \par pt denies any recent fever, chills, n/v/d, cp or sob \par

## 2019-02-13 NOTE — ASSESSMENT
[FreeTextEntry1] : Denies fever/chills or pain in the right lower leg. On examination, small round eschar intact with slight erythema in the surrounding skin slightly improved compared to 1/24. The edges of the eschar is starting to lift off of the skin. No discharge. Recommended continue applying silver sulfadiazine twice a day to the area. Patient is to follow up in about two weeks. All questions answered.

## 2019-02-13 NOTE — REVIEW OF SYSTEMS
[Fever] : no fever [Chills] : no chills [Eyesight Problems] : no eyesight problems [Loss Of Hearing] : no hearing loss [Chest Pain] : no chest pain [Palpitations] : no palpitations [Shortness Of Breath] : no shortness of breath [Cough] : no cough [Abdominal Pain] : no abdominal pain [Dysuria] : no dysuria

## 2019-02-20 ENCOUNTER — APPOINTMENT (OUTPATIENT)
Dept: INTERVENTIONAL RADIOLOGY/VASCULAR | Facility: CLINIC | Age: 19
End: 2019-02-20
Payer: COMMERCIAL

## 2019-02-26 ENCOUNTER — APPOINTMENT (OUTPATIENT)
Dept: INTERVENTIONAL RADIOLOGY/VASCULAR | Facility: CLINIC | Age: 19
End: 2019-02-26
Payer: COMMERCIAL

## 2019-02-26 VITALS
RESPIRATION RATE: 16 BRPM | OXYGEN SATURATION: 98 % | HEART RATE: 82 BPM | BODY MASS INDEX: 18.9 KG/M2 | DIASTOLIC BLOOD PRESSURE: 69 MMHG | SYSTOLIC BLOOD PRESSURE: 104 MMHG | WEIGHT: 132 LBS | HEIGHT: 70 IN

## 2019-02-26 PROCEDURE — 99213 OFFICE O/P EST LOW 20 MIN: CPT

## 2019-02-26 RX ORDER — NAPROXEN 500 MG/1
500 TABLET ORAL
Refills: 0 | Status: COMPLETED | COMMUNITY
End: 2019-02-26

## 2019-02-26 RX ORDER — VITAMIN B COMPLEX
CAPSULE ORAL
Refills: 0 | Status: COMPLETED | COMMUNITY
End: 2019-02-26

## 2019-02-26 RX ORDER — BACILLUS COAGULANS/INULIN 1B-250 MG
CAPSULE ORAL
Refills: 0 | Status: ACTIVE | COMMUNITY

## 2019-03-12 NOTE — REVIEW OF SYSTEMS
[Fever] : no fever [Chills] : no chills [Eyesight Problems] : no eyesight problems [Loss Of Hearing] : no hearing loss [Chest Pain] : no chest pain [Palpitations] : no palpitations [Shortness Of Breath] : no shortness of breath [Cough] : no cough [Abdominal Pain] : no abdominal pain [Diarrhea] : no diarrhea [Dysuria] : no dysuria [Easy Bleeding] : no tendency for easy bleeding [Easy Bruising] : no tendency for easy bruising

## 2019-03-12 NOTE — ASSESSMENT
[FreeTextEntry1] : Denies fever/chills or pain in the right lower leg. Patient states the eschar fell off spontaneously and a smaller one formed right after. On examination, small round eschar intact with slight erythema in the surrounding skin  improved compared to 2/5 . No discharge. Patient is to follow up in one month.   All questions answered.

## 2019-03-12 NOTE — PHYSICAL EXAM
[Alert] : alert [Normal Sclera/Conjunctiva] : normal sclera/conjunctiva [Normal Hearing] : hearing was normal [No Respiratory Distress] : no respiratory distress [No Accessory Muscle Use] : no accessory muscle use [Normal Rate] : heart rate was normal  [Normal Gait] : normal gait [No Tremors] : no tremors [Oriented x3] : oriented to person, place, and time [de-identified] : small round eschar intact with slight erythema in the surrounding skin improved compared to 02/05/19. The edges of the eschar is starting to lift off of the skin. No discharge.

## 2019-04-02 ENCOUNTER — APPOINTMENT (OUTPATIENT)
Dept: INTERVENTIONAL RADIOLOGY/VASCULAR | Facility: CLINIC | Age: 19
End: 2019-04-02

## 2019-04-02 NOTE — HISTORY OF PRESENT ILLNESS
[FreeTextEntry1] : 18 year old male with past medical history to include right leg pain. Denied having any trauma or fall pt reports the pain as excruciating pain that usually occurs at the end of the day. pain does wake him up from his sleep at night. Pain gets worst with activity rates the pain as 8/10 pt reports relief from pain when he takes Naprosyn once or twice a day. The pt had xray, CT scan and recent MRI done that revealed a lesion. \par \par He is S/P biopsy and RFA of right tibial osteoid osteoma on 12/20/18. Pt presented to ER few days later with complaints of swelling and erythema. Pt had xray and MRI done that revealed “nonspecific mild pretibial soft tissue swelling/inflammation however without a discrete underlying drainable abscess collection. Pt was treated with antibiotics and came to the office for a site check and noted to have serous drainage from the incision site. There are no signs of infection. Pt was advised to apply Betadine once a day and leave the area open to air ,\par \par Pathology revealed “core biopsy consisting of sclerotic cortical bone demonstrating a well demarcated area of remodeling/ woven bone with thermal artifact, limiting evaluation. No definitive nidus is identified. These findings are not definitive for osteoid osteoma”\par \par He was last seen in  the office on 2/26/2019 and was advised to present today for a follow up appointment.\par \par The patient denies chest pain, shortness of breath, cough, hemoptysis, fever, palpitations, syncope, URI  or recent illness ??\par  \par \par \par

## 2019-04-09 ENCOUNTER — TRANSCRIPTION ENCOUNTER (OUTPATIENT)
Age: 19
End: 2019-04-09

## 2019-08-27 NOTE — H&P ADULT - NSHPOUTPATIENTPROVIDERS_GEN_ALL_CORE
Richard Bardales [phone] pcp  Jose Louis (IR) Azathioprine Counseling:  I discussed with the patient the risks of azathioprine including but not limited to myelosuppression, immunosuppression, hepatotoxicity, lymphoma, and infections.  The patient understands that monitoring is required including baseline LFTs, Creatinine, possible TPMP genotyping and weekly CBCs for the first month and then every 2 weeks thereafter.  The patient verbalized understanding of the proper use and possible adverse effects of azathioprine.  All of the patient's questions and concerns were addressed.

## 2019-09-09 ENCOUNTER — TRANSCRIPTION ENCOUNTER (OUTPATIENT)
Age: 19
End: 2019-09-09

## 2019-09-10 ENCOUNTER — EMERGENCY (EMERGENCY)
Facility: HOSPITAL | Age: 19
LOS: 1 days | Discharge: HOME | End: 2019-09-10
Attending: EMERGENCY MEDICINE | Admitting: EMERGENCY MEDICINE
Payer: COMMERCIAL

## 2019-09-10 VITALS
DIASTOLIC BLOOD PRESSURE: 60 MMHG | SYSTOLIC BLOOD PRESSURE: 100 MMHG | TEMPERATURE: 99 F | HEART RATE: 76 BPM | RESPIRATION RATE: 16 BRPM | OXYGEN SATURATION: 99 %

## 2019-09-10 VITALS
HEART RATE: 89 BPM | RESPIRATION RATE: 16 BRPM | TEMPERATURE: 98 F | OXYGEN SATURATION: 100 % | DIASTOLIC BLOOD PRESSURE: 64 MMHG | WEIGHT: 130.07 LBS | HEIGHT: 70 IN | SYSTOLIC BLOOD PRESSURE: 101 MMHG

## 2019-09-10 DIAGNOSIS — Z98.890 OTHER SPECIFIED POSTPROCEDURAL STATES: Chronic | ICD-10-CM

## 2019-09-10 DIAGNOSIS — Z41.9 ENCOUNTER FOR PROCEDURE FOR PURPOSES OTHER THAN REMEDYING HEALTH STATE, UNSPECIFIED: Chronic | ICD-10-CM

## 2019-09-10 LAB
ALBUMIN SERPL ELPH-MCNC: 4.1 G/DL — SIGNIFICANT CHANGE UP (ref 3.3–5)
ALP SERPL-CCNC: 131 U/L — HIGH (ref 40–120)
ALT FLD-CCNC: 83 U/L — HIGH (ref 12–78)
ANION GAP SERPL CALC-SCNC: 5 MMOL/L — SIGNIFICANT CHANGE UP (ref 5–17)
AST SERPL-CCNC: 70 U/L — HIGH (ref 15–37)
BASOPHILS # BLD AUTO: 0 K/UL — SIGNIFICANT CHANGE UP (ref 0–0.2)
BASOPHILS NFR BLD AUTO: 0 % — SIGNIFICANT CHANGE UP (ref 0–2)
BILIRUB SERPL-MCNC: 0.4 MG/DL — SIGNIFICANT CHANGE UP (ref 0.2–1.2)
BUN SERPL-MCNC: 12 MG/DL — SIGNIFICANT CHANGE UP (ref 7–23)
CALCIUM SERPL-MCNC: 9.1 MG/DL — SIGNIFICANT CHANGE UP (ref 8.5–10.1)
CHLORIDE SERPL-SCNC: 107 MMOL/L — SIGNIFICANT CHANGE UP (ref 96–108)
CO2 SERPL-SCNC: 27 MMOL/L — SIGNIFICANT CHANGE UP (ref 22–31)
CREAT SERPL-MCNC: 1.1 MG/DL — SIGNIFICANT CHANGE UP (ref 0.5–1.3)
EOSINOPHIL # BLD AUTO: 0 K/UL — SIGNIFICANT CHANGE UP (ref 0–0.5)
EOSINOPHIL NFR BLD AUTO: 0 % — SIGNIFICANT CHANGE UP (ref 0–6)
GLUCOSE SERPL-MCNC: 117 MG/DL — HIGH (ref 70–99)
HCT VFR BLD CALC: 42.1 % — SIGNIFICANT CHANGE UP (ref 39–50)
HGB BLD-MCNC: 14.5 G/DL — SIGNIFICANT CHANGE UP (ref 13–17)
LACTATE SERPL-SCNC: 0.8 MMOL/L — SIGNIFICANT CHANGE UP (ref 0.7–2)
LYMPHOCYTES # BLD AUTO: 0.72 K/UL — LOW (ref 1–3.3)
LYMPHOCYTES # BLD AUTO: 17.5 % — SIGNIFICANT CHANGE UP (ref 13–44)
MCHC RBC-ENTMCNC: 29.2 PG — SIGNIFICANT CHANGE UP (ref 27–34)
MCHC RBC-ENTMCNC: 34.4 GM/DL — SIGNIFICANT CHANGE UP (ref 32–36)
MCV RBC AUTO: 84.9 FL — SIGNIFICANT CHANGE UP (ref 80–100)
MONOCYTES # BLD AUTO: 0 K/UL — SIGNIFICANT CHANGE UP (ref 0–0.9)
MONOCYTES NFR BLD AUTO: 0 % — LOW (ref 2–14)
NEUTROPHILS # BLD AUTO: 2.76 K/UL — SIGNIFICANT CHANGE UP (ref 1.8–7.4)
NEUTROPHILS NFR BLD AUTO: 66.2 % — SIGNIFICANT CHANGE UP (ref 43–77)
NRBC # BLD: SIGNIFICANT CHANGE UP /100 WBCS (ref 0–0)
PLATELET # BLD AUTO: 186 K/UL — SIGNIFICANT CHANGE UP (ref 150–400)
POTASSIUM SERPL-MCNC: 4.8 MMOL/L — SIGNIFICANT CHANGE UP (ref 3.5–5.3)
POTASSIUM SERPL-SCNC: 4.8 MMOL/L — SIGNIFICANT CHANGE UP (ref 3.5–5.3)
PROT SERPL-MCNC: 7.8 G/DL — SIGNIFICANT CHANGE UP (ref 6–8.3)
RBC # BLD: 4.96 M/UL — SIGNIFICANT CHANGE UP (ref 4.2–5.8)
RBC # FLD: 12.3 % — SIGNIFICANT CHANGE UP (ref 10.3–14.5)
SODIUM SERPL-SCNC: 139 MMOL/L — SIGNIFICANT CHANGE UP (ref 135–145)
WBC # BLD: 4.09 K/UL — SIGNIFICANT CHANGE UP (ref 3.8–10.5)
WBC # FLD AUTO: 4.09 K/UL — SIGNIFICANT CHANGE UP (ref 3.8–10.5)

## 2019-09-10 PROCEDURE — 85027 COMPLETE CBC AUTOMATED: CPT

## 2019-09-10 PROCEDURE — 83605 ASSAY OF LACTIC ACID: CPT

## 2019-09-10 PROCEDURE — 99283 EMERGENCY DEPT VISIT LOW MDM: CPT

## 2019-09-10 PROCEDURE — 87040 BLOOD CULTURE FOR BACTERIA: CPT

## 2019-09-10 PROCEDURE — 80053 COMPREHEN METABOLIC PANEL: CPT

## 2019-09-10 PROCEDURE — 36415 COLL VENOUS BLD VENIPUNCTURE: CPT

## 2019-09-10 RX ORDER — AZTREONAM 2 G
0 VIAL (EA) INJECTION
Qty: 0 | Refills: 0 | DISCHARGE

## 2019-09-10 RX ORDER — MUPIROCIN 20 MG/G
0 OINTMENT TOPICAL
Qty: 0 | Refills: 0 | DISCHARGE

## 2019-09-10 RX ORDER — LACTOBACILLUS ACIDOPHILUS 100MM CELL
2 CAPSULE ORAL
Qty: 0 | Refills: 0 | DISCHARGE

## 2019-09-10 NOTE — ED PROVIDER NOTE - OBJECTIVE STATEMENT
Pt is a 18 yo male with pmhx of ADD RSV c/o of fever for a few days and next day rash started on right side of face getting worse itchy and warm. Pt states he went to urgent care started on bactrim and keflex and advised he has folliculitis. Pt went to dermatologist today and advised allergic started on prednisone took 40 mg today but symptoms worse. Pt was advised to continue bactrim and stop keflex. pt denies any rash at other area no recent travels no nvd no abdominal pain no dysuria. Pt states only thing new is shampoo. pt is vaccinated

## 2019-09-10 NOTE — ED PROVIDER NOTE - PATIENT PORTAL LINK FT
You can access the FollowMyHealth Patient Portal offered by [facility] by registering at the following website: [email or web address]. By joining [employer]’s FollowMyHealth portal, you will also be able to view your health information using other applications (apps) compatible with our system.

## 2019-09-10 NOTE — ED ADULT NURSE NOTE - ED STAT RN HANDOFF DETAILS
Pt stable and resting in bed. Pt denies any pain or discomfort as of this time. Pt  handoff report giver to JERAD Atwood    for continuum of care. No sign of distress noted Pt stable and resting in bed. Pt denies any pain or discomfort as of this time. Pt  handoff report giver to JERAD Montoya for continuum of care. No sign of distress noted

## 2019-09-10 NOTE — ED PROVIDER NOTE - ATTENDING CONTRIBUTION TO CARE
20 yo male hx of ADD c/o low grade fever for several days, then developed a rash on right side of face that is itchy and slightly warm, went to urgent care, started on bactrim (took 2 doses) and keflex treated for erysipelas, seen dermatologist today, given prednisone 40mg.  Was told to d/c keflex.  No neck stiffness, no photophobia.  Took benadryl with some relief.    Gen: Alert, NAD, nontoxic  Head/eyes: NC/AT, PERRL, EOMI, normal lids/conjunctiva, no scleral icterus  ENT: Bilateral TM WNL, normal hearing, patent oropharynx without erythema/exudate, uvula midline, no peritonsillar abscess, no tongue/uvula swelling  Neck: supple, no tenderness/meningismus/JVD, Trachea midline  Pulm/lung: Bilateral clear BS, normal resp effort, no wheeze/stridor/retractions  CV/heart: RRR, no M/R/G, +2 dist pulses (radial, pedal DP/PT, popliteal)  GI/Abd: soft, NT/ND, +BS, no guarding/rebound tenderness  Musculoskeletal: no edema/erythema/cyanosis, FROM in all extremities, no C/T/L spine ttp  Skin: +right side of face erythema with mild warmth, no induration, no fluctuance, extending to under left side of chin, no vesicles  Neuro: AAOx3, CN 2-12 intact, normal sensation, 5/5 motor strength in all extremities, normal gait, no dysmetria      component of cellulitis vs allergic reaction? labs wnl, lactate wnl, cultures sent, recommend to continue PO bactrim and steroids, f/u with pmd and dermatologist, understands to return if rash getting worse

## 2019-09-10 NOTE — ED ADULT NURSE NOTE - OBJECTIVE STATEMENT
Received pt in bed alert and oriented x4.  C/O rash  for couple days.  Rash started on right side of face now spreading to left side of face.  Pt complaints of feeling weak, fevers, and nausea.  Ongoing nursing care and safety maintained.

## 2019-09-10 NOTE — ED PROVIDER NOTE - SKIN, MLM
Skin normal color for race, warm, dry and intact. macular papular erythematous rash to right side of face and left neck + warmth no d/c

## 2019-09-10 NOTE — ED PROVIDER NOTE - NSFOLLOWUPINSTRUCTIONS_ED_ALL_ED_FT
Please continue steroids and Bactrim follow up with dermatologist return to er for any worsening symptoms

## 2019-09-16 LAB
CULTURE RESULTS: SIGNIFICANT CHANGE UP
CULTURE RESULTS: SIGNIFICANT CHANGE UP
SPECIMEN SOURCE: SIGNIFICANT CHANGE UP
SPECIMEN SOURCE: SIGNIFICANT CHANGE UP

## 2019-09-17 ENCOUNTER — TRANSCRIPTION ENCOUNTER (OUTPATIENT)
Age: 19
End: 2019-09-17

## 2019-12-08 ENCOUNTER — TRANSCRIPTION ENCOUNTER (OUTPATIENT)
Age: 19
End: 2019-12-08

## 2020-01-15 NOTE — ED ADULT NURSE NOTE - PAIN RATING/NUMBER SCALE (0-10): ACTIVITY
Patient was discussed at [facility] this morning per Dr Vick Rush request  Recent MRI was reviewed  MRI is stable  The recommendations are temodar and continued observation with MRIs  Patient is scheduled to see Dr Megan Alonso on 1/21/20  Dr Toi Cochran was made aware  0

## 2021-07-23 ENCOUNTER — TRANSCRIPTION ENCOUNTER (OUTPATIENT)
Age: 21
End: 2021-07-23

## 2022-06-20 NOTE — ED PROVIDER NOTE - CROS ED ENMT ALL NEG
negative... High Dose Vitamin A Counseling: Side effects reviewed, pt to contact office should one occur.

## 2022-10-03 NOTE — ED PROVIDER NOTE - PROGRESS NOTE DETAILS
COUGH
labs wnl  stable for d/c cultures pending looks well advised to continue steroids and bactrim f/u with derm

## 2023-10-17 ENCOUNTER — NON-APPOINTMENT (OUTPATIENT)
Age: 23
End: 2023-10-17

## 2023-10-19 ENCOUNTER — APPOINTMENT (OUTPATIENT)
Dept: CARDIOLOGY | Facility: CLINIC | Age: 23
End: 2023-10-19
Payer: COMMERCIAL

## 2023-10-19 VITALS
HEART RATE: 84 BPM | OXYGEN SATURATION: 98 % | WEIGHT: 127 LBS | HEIGHT: 70 IN | SYSTOLIC BLOOD PRESSURE: 110 MMHG | BODY MASS INDEX: 18.18 KG/M2 | TEMPERATURE: 209.48 F | DIASTOLIC BLOOD PRESSURE: 66 MMHG

## 2023-10-19 DIAGNOSIS — Z13.228 ENCOUNTER FOR SCREENING FOR OTHER METABOLIC DISORDERS: ICD-10-CM

## 2023-10-19 DIAGNOSIS — R42 DIZZINESS AND GIDDINESS: ICD-10-CM

## 2023-10-19 DIAGNOSIS — D22.9 MELANOCYTIC NEVI, UNSPECIFIED: ICD-10-CM

## 2023-10-19 DIAGNOSIS — F17.290 NICOTINE DEPENDENCE, OTHER TOBACCO PRODUCT, UNCOMPLICATED: ICD-10-CM

## 2023-10-19 PROCEDURE — 99204 OFFICE O/P NEW MOD 45 MIN: CPT | Mod: 25

## 2023-10-19 PROCEDURE — 93000 ELECTROCARDIOGRAM COMPLETE: CPT

## 2023-10-20 ENCOUNTER — APPOINTMENT (OUTPATIENT)
Dept: CARDIOLOGY | Facility: CLINIC | Age: 23
End: 2023-10-20

## 2023-10-23 ENCOUNTER — APPOINTMENT (OUTPATIENT)
Dept: CARDIOLOGY | Facility: CLINIC | Age: 23
End: 2023-10-23

## 2023-11-01 RX ORDER — UBIDECARENONE/VIT E ACET 100MG-5
50 MCG CAPSULE ORAL
Qty: 90 | Refills: 0 | Status: ACTIVE | COMMUNITY
Start: 2023-10-31 | End: 1900-01-01

## 2023-11-15 ENCOUNTER — APPOINTMENT (OUTPATIENT)
Dept: CARDIOLOGY | Facility: CLINIC | Age: 23
End: 2023-11-15
Payer: COMMERCIAL

## 2023-11-15 ENCOUNTER — RX RENEWAL (OUTPATIENT)
Age: 23
End: 2023-11-15

## 2023-11-15 VITALS
OXYGEN SATURATION: 99 % | TEMPERATURE: 209.3 F | HEIGHT: 70 IN | DIASTOLIC BLOOD PRESSURE: 70 MMHG | HEART RATE: 78 BPM | BODY MASS INDEX: 19.18 KG/M2 | WEIGHT: 134 LBS | SYSTOLIC BLOOD PRESSURE: 110 MMHG

## 2023-11-15 DIAGNOSIS — R06.02 SHORTNESS OF BREATH: ICD-10-CM

## 2023-11-15 DIAGNOSIS — E55.9 VITAMIN D DEFICIENCY, UNSPECIFIED: ICD-10-CM

## 2023-11-15 DIAGNOSIS — R89.9 UNSPECIFIED ABNORMAL FINDING IN SPECIMENS FROM OTHER ORGANS, SYSTEMS AND TISSUES: ICD-10-CM

## 2023-11-15 DIAGNOSIS — R00.0 TACHYCARDIA, UNSPECIFIED: ICD-10-CM

## 2023-11-15 PROCEDURE — 93306 TTE W/DOPPLER COMPLETE: CPT

## 2023-11-15 PROCEDURE — 93000 ELECTROCARDIOGRAM COMPLETE: CPT

## 2023-11-15 PROCEDURE — 99214 OFFICE O/P EST MOD 30 MIN: CPT | Mod: 25

## 2023-11-15 RX ORDER — ADHESIVE TAPE 3"X 2.3 YD
50 MCG TAPE, NON-MEDICATED TOPICAL
Qty: 30 | Refills: 3 | Status: ACTIVE | COMMUNITY
Start: 2023-11-15 | End: 1900-01-01

## 2023-11-29 ENCOUNTER — NON-APPOINTMENT (OUTPATIENT)
Age: 23
End: 2023-11-29

## 2023-11-30 LAB
ANION GAP SERPL CALC-SCNC: 11 MMOL/L
BUN SERPL-MCNC: 8 MG/DL
CALCIUM SERPL-MCNC: 9.8 MG/DL
CHLORIDE SERPL-SCNC: 101 MMOL/L
CO2 SERPL-SCNC: 25 MMOL/L
CREAT SERPL-MCNC: 0.89 MG/DL
EGFR: 123 ML/MIN/1.73M2
FRUCTOSAMINE SERPL-MCNC: 240 UMOL/L
GLUCOSE SERPL-MCNC: 93 MG/DL
POTASSIUM SERPL-SCNC: 4.3 MMOL/L
SODIUM SERPL-SCNC: 138 MMOL/L

## 2023-12-04 ENCOUNTER — APPOINTMENT (OUTPATIENT)
Dept: ORTHOPEDIC SURGERY | Facility: CLINIC | Age: 23
End: 2023-12-04
Payer: COMMERCIAL

## 2023-12-04 VITALS
HEIGHT: 70 IN | DIASTOLIC BLOOD PRESSURE: 79 MMHG | SYSTOLIC BLOOD PRESSURE: 121 MMHG | BODY MASS INDEX: 19.18 KG/M2 | WEIGHT: 134 LBS

## 2023-12-04 DIAGNOSIS — D16.9 BENIGN NEOPLASM OF BONE AND ARTICULAR CARTILAGE, UNSPECIFIED: ICD-10-CM

## 2023-12-04 DIAGNOSIS — M89.9 DISORDER OF BONE, UNSPECIFIED: ICD-10-CM

## 2023-12-04 PROCEDURE — 99203 OFFICE O/P NEW LOW 30 MIN: CPT

## 2023-12-04 PROCEDURE — 73590 X-RAY EXAM OF LOWER LEG: CPT | Mod: RT

## 2024-03-14 NOTE — PROGRESS NOTE ADULT - NEGATIVE GASTROINTESTINAL SYMPTOMS
Physical Therapy Daily Treatment/Discharge Note    [facility]  [address] [place] [address]  [phone] (phone)  [phone] (fax)    Patient: Catrachito Winkler   : 1954  Diagnosis/ICD-10 Code:  Sprain of right ankle, unspecified ligament, initial encounter [S93.401A]  Referring practitioner: JANE Conley*  Today's Date: 3/14/2024  Patient seen for 9 sessions    Visit Diagnoses:    ICD-10-CM ICD-9-CM   1. Sprain of right ankle, unspecified ligament, initial encounter  S93.401A 845.00   2. Loss of movement  R29.898 344.9   3. Difficulty walking  R26.2 719.7   4. Abnormal gait  R26.9 781.2   5. Difficulty navigating stairs  Z78.9 V49.89              Subjective: Hao reports that his R ankle is feeling good.     Objective     Treatment  Therapeutic exercise  NuStep, seat at 9, work load of 6 x 5 minutes  Matrix leg press, seat at 8, 60 and 65 lbs., each x 15  Matrix leg curl, 20 lbs., 10 x 3  Matrix hip abduction, 50 lbs., 15 x 2    Reassessment  Figure 8 girth of R ankle: 55.9 cm    Supine AROM/MMT of the R ankle  Dorsiflexion 5 degrees, 5/5  Plantarflexion: 32 degrees, 4+/5  Inversion 14 degrees, 4+/5  Eversion 8 degrees, 5/5    NMR: verbal cues for exercise technique were used emphasizing eccentric control with each exercise and appropriate ROM with the single leg press.    Self care:  I gave Hao final instructions to continue working independently to include doing his dorsiflexion PRE, seated, heel raises and runner's stretch daily.  For his LE function to do the step exercises, leg press, leg curl and hip abduction, 2 x per week.     Assessment & Plan       Assessment  Assessment details: Catrachito Winkler was seen for 9 physical therapy sessions for sprain of R ankle Treatment included therapeutic exercise, manual therapy, neuro-muscular retraining , gait training, and patient education with home exercise program . Progress to physical therapy goals  was good.  He was discharged to an independent HEP and provided patient education to self-manage condition.      Goals  Plan Goals: STGs to be met in 4 weeks  1. Hao begins NuStep to promote improve movement in the hips, knees and ankles.(Met)  2. PREs to improve ankle strength are begun using theraband. (Met)  3. Gait training, focusing on a heel to toe pattern, is begun. (Met)     LTGs to be met in 12 weeks  1. B ankle dorsiflexion increases 5 degrees.(Not met)  2. Hao is able to navigate stairs, using hand rail and SC, with a step through technique, independently. (Met)  3. He is independent with a HEP and self care. (Met)         Plan  Plan details: Discharged from skilled physical therapy               Timed:    Manual Therapy:         mins  69751;  Therapeutic Exercise:    35     mins  17798;     Neuromuscular Freddy:    2    mins  48974;    Therapeutic Activity:          mins  91741;     Gait Training:           mins  37670;     Ultrasound:          mins  47448;    Aquatic Therapy         mins 06576;  Self Care                       8     mins   68277        Untimed:  Electrical Stimulation:         mins  88836 ( );  Traction:         mins  93269;   Dry Needling  (1-2 muscles)                 mins 36433 (Self-pay)  Dry Needling (3-4 muscles)      20561 (Self-pay)  Dry Needling Trial         DRYNDLTRIAL  (No Charge)    Timed Treatment:   45   mins   Total Treatment:     45   mins    Nirav Hong PT  Physical Therapist    [place] License:[id number]   no vomiting/no abdominal pain/no nausea/no diarrhea

## 2024-05-03 ENCOUNTER — NON-APPOINTMENT (OUTPATIENT)
Age: 24
End: 2024-05-03

## 2024-05-07 LAB
25(OH)D3 SERPL-MCNC: 21.5 NG/ML
ALBUMIN SERPL ELPH-MCNC: 5 G/DL
ALP BLD-CCNC: 78 U/L
ALT SERPL-CCNC: 14 U/L
ANION GAP SERPL CALC-SCNC: 15 MMOL/L
APPEARANCE: CLEAR
AST SERPL-CCNC: 29 U/L
BACTERIA: NEGATIVE /HPF
BASOPHILS # BLD AUTO: 0.04 K/UL
BASOPHILS NFR BLD AUTO: 0.6 %
BILIRUB SERPL-MCNC: 0.4 MG/DL
BILIRUBIN URINE: NEGATIVE
BLOOD URINE: NEGATIVE
BUN SERPL-MCNC: 8 MG/DL
CALCIUM SERPL-MCNC: 10 MG/DL
CAST: 0 /LPF
CHLORIDE SERPL-SCNC: 98 MMOL/L
CHOLEST SERPL-MCNC: 148 MG/DL
CO2 SERPL-SCNC: 26 MMOL/L
COLOR: YELLOW
CREAT SERPL-MCNC: 0.76 MG/DL
EGFR: 129 ML/MIN/1.73M2
EOSINOPHIL # BLD AUTO: 0.25 K/UL
EOSINOPHIL NFR BLD AUTO: 3.6 %
EPITHELIAL CELLS: 0 /HPF
ESTIMATED AVERAGE GLUCOSE: 91 MG/DL
FERRITIN SERPL-MCNC: 62 NG/ML
GLUCOSE QUALITATIVE U: NEGATIVE MG/DL
GLUCOSE SERPL-MCNC: 48 MG/DL
HBA1C MFR BLD HPLC: 4.8 %
HCT VFR BLD CALC: 43 %
HDLC SERPL-MCNC: 73 MG/DL
HGB BLD-MCNC: 15.1 G/DL
IMM GRANULOCYTES NFR BLD AUTO: 0.1 %
IRON SATN MFR SERPL: 22 %
IRON SERPL-MCNC: 80 UG/DL
KETONES URINE: NEGATIVE MG/DL
LDLC SERPL CALC-MCNC: 64 MG/DL
LEUKOCYTE ESTERASE URINE: NEGATIVE
LYMPHOCYTES # BLD AUTO: 1.84 K/UL
LYMPHOCYTES NFR BLD AUTO: 26.5 %
MAGNESIUM SERPL-MCNC: 2.3 MG/DL
MAN DIFF?: NORMAL
MCHC RBC-ENTMCNC: 31.7 PG
MCHC RBC-ENTMCNC: 35.1 GM/DL
MCV RBC AUTO: 90.1 FL
MICROSCOPIC-UA: NORMAL
MONOCYTES # BLD AUTO: 0.91 K/UL
MONOCYTES NFR BLD AUTO: 13.1 %
NEUTROPHILS # BLD AUTO: 3.89 K/UL
NEUTROPHILS NFR BLD AUTO: 56.1 %
NITRITE URINE: NEGATIVE
NONHDLC SERPL-MCNC: 75 MG/DL
PH URINE: 7
PLATELET # BLD AUTO: 335 K/UL
POTASSIUM SERPL-SCNC: 4.2 MMOL/L
PROT SERPL-MCNC: 7.6 G/DL
PROTEIN URINE: NEGATIVE MG/DL
RBC # BLD: 4.77 M/UL
RBC # FLD: 12.4 %
RED BLOOD CELLS URINE: 1 /HPF
SODIUM SERPL-SCNC: 139 MMOL/L
SPECIFIC GRAVITY URINE: 1.02
T4 FREE SERPL-MCNC: 1.3 NG/DL
TIBC SERPL-MCNC: 359 UG/DL
TRIGL SERPL-MCNC: 46 MG/DL
TSH SERPL-ACNC: 1.15 UIU/ML
UIBC SERPL-MCNC: 279 UG/DL
UROBILINOGEN URINE: 0.2 MG/DL
WBC # FLD AUTO: 6.94 K/UL
WHITE BLOOD CELLS URINE: 0 /HPF

## 2024-08-16 NOTE — ED ADULT NURSE NOTE - NSFALLRSKUNASSIST_ED_ALL_ED
How Severe Is Your Skin Lesion?: moderate
Has Your Skin Lesion Been Treated?: not been treated
Is This A New Presentation, Or A Follow-Up?: Growths
no

## 2025-01-31 ENCOUNTER — NON-APPOINTMENT (OUTPATIENT)
Age: 25
End: 2025-01-31